# Patient Record
Sex: FEMALE | Race: WHITE | NOT HISPANIC OR LATINO | URBAN - METROPOLITAN AREA
[De-identification: names, ages, dates, MRNs, and addresses within clinical notes are randomized per-mention and may not be internally consistent; named-entity substitution may affect disease eponyms.]

---

## 2017-04-10 ENCOUNTER — APPOINTMENT (OUTPATIENT)
Dept: WOMENS IMAGING | Facility: HOSPITAL | Age: 52
End: 2017-04-10

## 2017-04-10 PROCEDURE — 77067 SCR MAMMO BI INCL CAD: CPT | Performed by: RADIOLOGY

## 2022-01-18 ENCOUNTER — OFFICE VISIT (OUTPATIENT)
Dept: FAMILY MEDICINE CLINIC | Facility: CLINIC | Age: 57
End: 2022-01-18

## 2022-01-18 VITALS
HEART RATE: 73 BPM | RESPIRATION RATE: 16 BRPM | DIASTOLIC BLOOD PRESSURE: 90 MMHG | HEIGHT: 67 IN | BODY MASS INDEX: 45.99 KG/M2 | SYSTOLIC BLOOD PRESSURE: 130 MMHG | OXYGEN SATURATION: 98 % | WEIGHT: 293 LBS | TEMPERATURE: 97.5 F

## 2022-01-18 DIAGNOSIS — R73.01 IMPAIRED FASTING GLUCOSE: ICD-10-CM

## 2022-01-18 DIAGNOSIS — I89.0 LYMPHEDEMA: ICD-10-CM

## 2022-01-18 DIAGNOSIS — I10 PRIMARY HYPERTENSION: Primary | ICD-10-CM

## 2022-01-18 DIAGNOSIS — E03.9 ACQUIRED HYPOTHYROIDISM: ICD-10-CM

## 2022-01-18 DIAGNOSIS — G47.33 OBSTRUCTIVE SLEEP APNEA SYNDROME: ICD-10-CM

## 2022-01-18 PROCEDURE — 99203 OFFICE O/P NEW LOW 30 MIN: CPT | Performed by: NURSE PRACTITIONER

## 2022-01-18 RX ORDER — LEVOTHYROXINE SODIUM 0.2 MG/1
200 TABLET ORAL DAILY
COMMUNITY
Start: 2021-12-10 | End: 2022-02-21 | Stop reason: SDUPTHER

## 2022-01-18 RX ORDER — LISINOPRIL 20 MG/1
20 TABLET ORAL DAILY
COMMUNITY
Start: 2014-01-17 | End: 2022-04-22 | Stop reason: SDUPTHER

## 2022-01-18 RX ORDER — FUROSEMIDE 20 MG/1
20 TABLET ORAL
COMMUNITY
Start: 2021-12-10 | End: 2022-11-17

## 2022-01-18 RX ORDER — ALPRAZOLAM 0.5 MG/1
0.5 TABLET ORAL
COMMUNITY
Start: 2013-08-26 | End: 2022-11-17 | Stop reason: SDUPTHER

## 2022-01-18 NOTE — PROGRESS NOTES
Subjective   Sanaz Quiroz is a 56 y.o. female.     History of Present Illness   Sanaz Quiroz 56 y.o. female who presents today for a new patient appointment.    she has a history of   Patient Active Problem List   Diagnosis   • Hypothyroidism   • Hypertension   • Lymphedema   • Anxiety   • Sleep apnea   .  she is here to establish care I reviewed the PFSH recorded today by my MA/LPN staff.   she   She has been feeling fairly well.  Patient was seeing PCP at [place].   Patient is currently seeing PT for lymphedema.     She has YOSEPH and has been given CPAP but has difficulty using due to claustrophobia.  She was prescribed alprazolam to use as needed for panic attack which she takes about once a month.  She tried using it for CPAP with no significant improvement.  She has seen Dr. Curry for pressure adjustments with no improvement.  She sleeps sitting up in recliner most nights.          She has hypothyroidism and is currently taking levothyroxine 200 mcg daily.  Her levels have been fluctuating for the past couple of years.  Her last lab set showed elevated TSH level and her PCP added 50 mcg daily but patent never started taking.    The following portions of the patient's history were reviewed and updated as appropriate: allergies, current medications, past family history, past medical history, past social history, past surgical history and problem list.    Review of Systems   Constitutional: Positive for fatigue. Negative for unexpected weight change.   Respiratory: Negative for shortness of breath.    Cardiovascular: Positive for leg swelling. Negative for chest pain and palpitations.   Psychiatric/Behavioral: Negative for behavioral problems.       Objective   Physical Exam  Vitals and nursing note reviewed.   Constitutional:       Appearance: She is well-developed.   Neck:      Thyroid: No thyromegaly.      Vascular: No carotid bruit.   Cardiovascular:      Rate and Rhythm: Normal rate and regular rhythm.    Pulmonary:      Effort: Pulmonary effort is normal.      Breath sounds: Normal breath sounds.   Neurological:      Mental Status: She is alert and oriented to person, place, and time.   Psychiatric:         Mood and Affect: Mood normal.         Behavior: Behavior normal.         Thought Content: Thought content normal.         Judgment: Judgment normal.         Assessment/Plan   Diagnoses and all orders for this visit:    1. Primary hypertension (Primary)  -     Comprehensive metabolic panel  -     Lipid panel  -     CBC and Differential  -     TSH  -     T4, free  -     T3, free  -     Hemoglobin A1c  -     Vitamin D 25 Hydroxy    2. Acquired hypothyroidism  -     Comprehensive metabolic panel  -     Lipid panel  -     CBC and Differential  -     TSH  -     T4, free  -     T3, free  -     Hemoglobin A1c  -     Vitamin D 25 Hydroxy    3. Obstructive sleep apnea syndrome  -     Comprehensive metabolic panel  -     Lipid panel  -     CBC and Differential  -     TSH  -     T4, free  -     T3, free  -     Hemoglobin A1c  -     Vitamin D 25 Hydroxy    4. Impaired fasting glucose  -     Comprehensive metabolic panel  -     Lipid panel  -     CBC and Differential  -     TSH  -     T4, free  -     T3, free  -     Hemoglobin A1c  -     Vitamin D 25 Hydroxy    5. Lymphedema          Will get updated labs today including free T3.  Will then decide if thyroid dose needs to be adjusted.  May also have to consider switching to brand name Synthroid if continued fluctuation.      Patient will keep appt with PT for lymphedema.      She will continue trying to use CPAP at night and will start with using it while sitting up in chair for a few nights to get used to the face mask.  She will schedule appt with ENT to discuss if she is a candidate for dental appliance. If no improvement in tolerability of CPAP and not eligible for dental appliance, She will f/u with sleep medicine to discuss alternate options.  Patient understands the  importance in treating sleep apnea and is working to tolerate CPAP.   She will f/u in office in 6 weeks.

## 2022-01-23 LAB
25(OH)D3+25(OH)D2 SERPL-MCNC: 7.8 NG/ML (ref 30–100)
ALBUMIN SERPL-MCNC: 4.3 G/DL (ref 3.8–4.9)
ALBUMIN/GLOB SERPL: 1.5 {RATIO} (ref 1.2–2.2)
ALP SERPL-CCNC: 88 IU/L (ref 44–121)
ALT SERPL-CCNC: 28 IU/L (ref 0–32)
AST SERPL-CCNC: 16 IU/L (ref 0–40)
BASOPHILS # BLD AUTO: 0 X10E3/UL (ref 0–0.2)
BASOPHILS NFR BLD AUTO: 1 %
BILIRUB SERPL-MCNC: 0.6 MG/DL (ref 0–1.2)
BUN SERPL-MCNC: 12 MG/DL (ref 6–24)
BUN/CREAT SERPL: 15 (ref 9–23)
CALCIUM SERPL-MCNC: 9.3 MG/DL (ref 8.7–10.2)
CHLORIDE SERPL-SCNC: 101 MMOL/L (ref 96–106)
CHOLEST SERPL-MCNC: 194 MG/DL (ref 100–199)
CO2 SERPL-SCNC: 26 MMOL/L (ref 20–29)
CREAT SERPL-MCNC: 0.81 MG/DL (ref 0.57–1)
EOSINOPHIL # BLD AUTO: 0.1 X10E3/UL (ref 0–0.4)
EOSINOPHIL NFR BLD AUTO: 2 %
ERYTHROCYTE [DISTWIDTH] IN BLOOD BY AUTOMATED COUNT: 14 % (ref 11.7–15.4)
GLOBULIN SER CALC-MCNC: 2.8 G/DL (ref 1.5–4.5)
GLUCOSE SERPL-MCNC: 106 MG/DL (ref 65–99)
HBA1C MFR BLD: 6.5 % (ref 4.8–5.6)
HCT VFR BLD AUTO: 45.7 % (ref 34–46.6)
HDLC SERPL-MCNC: 45 MG/DL
HGB BLD-MCNC: 15.1 G/DL (ref 11.1–15.9)
IMM GRANULOCYTES # BLD AUTO: 0 X10E3/UL (ref 0–0.1)
IMM GRANULOCYTES NFR BLD AUTO: 1 %
LDLC SERPL CALC-MCNC: 133 MG/DL (ref 0–99)
LYMPHOCYTES # BLD AUTO: 2.1 X10E3/UL (ref 0.7–3.1)
LYMPHOCYTES NFR BLD AUTO: 26 %
MCH RBC QN AUTO: 29.8 PG (ref 26.6–33)
MCHC RBC AUTO-ENTMCNC: 33 G/DL (ref 31.5–35.7)
MCV RBC AUTO: 90 FL (ref 79–97)
MONOCYTES # BLD AUTO: 0.7 X10E3/UL (ref 0.1–0.9)
MONOCYTES NFR BLD AUTO: 9 %
NEUTROPHILS # BLD AUTO: 5.1 X10E3/UL (ref 1.4–7)
NEUTROPHILS NFR BLD AUTO: 61 %
PLATELET # BLD AUTO: 298 X10E3/UL (ref 150–450)
POTASSIUM SERPL-SCNC: 4.3 MMOL/L (ref 3.5–5.2)
PROT SERPL-MCNC: 7.1 G/DL (ref 6–8.5)
RBC # BLD AUTO: 5.06 X10E6/UL (ref 3.77–5.28)
SODIUM SERPL-SCNC: 139 MMOL/L (ref 134–144)
T3FREE SERPL-MCNC: 3 PG/ML (ref 2–4.4)
T4 FREE SERPL-MCNC: 1.32 NG/DL (ref 0.82–1.77)
TRIGL SERPL-MCNC: 89 MG/DL (ref 0–149)
TSH SERPL-ACNC: 4.21 UIU/ML (ref 0.45–4.5)
VLDLC SERPL CALC-MCNC: 16 MG/DL (ref 5–40)
WBC # BLD AUTO: 8.1 X10E3/UL (ref 3.4–10.8)

## 2022-01-24 DIAGNOSIS — E55.9 VITAMIN D DEFICIENCY: Primary | ICD-10-CM

## 2022-02-21 DIAGNOSIS — E55.9 VITAMIN D DEFICIENCY: ICD-10-CM

## 2022-02-21 RX ORDER — LEVOTHYROXINE SODIUM 0.2 MG/1
200 TABLET ORAL DAILY
Qty: 30 TABLET | Refills: 0 | Status: SHIPPED | OUTPATIENT
Start: 2022-02-21 | End: 2022-03-28 | Stop reason: SDUPTHER

## 2022-02-21 NOTE — TELEPHONE ENCOUNTER
Caller: Sanaz Quiroz    Relationship: Self    Best call back number: [phone]     Requested Prescriptions:   Requested Prescriptions     Pending Prescriptions Disp Refills   • Cholecalciferol 1.25 MG (00591 UT) tablet 13 tablet 3     Sig: Take 1 tablet by mouth 1 (One) Time Per Week.   • levothyroxine (SYNTHROID, LEVOTHROID) 200 MCG tablet       Sig: Take 1 tablet by mouth Daily.        Pharm[facility] where request should be sent: [facility] [address] [place] [address] RD AT [facility] & [address]  [phone] [facility] [phone] FX     Additional details provided by patient: PATIENT IS OUT OF MEDICATION. VITAMIN D WAS SENT TO WRONG PHARMACY AND PATIENT NEVER RECEIVED IT.      Does the patient have less than a 3 day supply:  [x] Yes  [] No    Víctor Brown Rep   02/21/22 14:11 EST

## 2022-03-28 ENCOUNTER — OFFICE VISIT (OUTPATIENT)
Dept: FAMILY MEDICINE CLINIC | Facility: CLINIC | Age: 57
End: 2022-03-28

## 2022-03-28 VITALS
DIASTOLIC BLOOD PRESSURE: 80 MMHG | OXYGEN SATURATION: 98 % | RESPIRATION RATE: 16 BRPM | TEMPERATURE: 97.5 F | BODY MASS INDEX: 45.99 KG/M2 | SYSTOLIC BLOOD PRESSURE: 140 MMHG | WEIGHT: 293 LBS | HEIGHT: 67 IN | HEART RATE: 79 BPM

## 2022-03-28 DIAGNOSIS — I89.0 LYMPHEDEMA: ICD-10-CM

## 2022-03-28 DIAGNOSIS — E55.9 VITAMIN D DEFICIENCY: Primary | ICD-10-CM

## 2022-03-28 DIAGNOSIS — E03.9 ACQUIRED HYPOTHYROIDISM: ICD-10-CM

## 2022-03-28 PROCEDURE — 99214 OFFICE O/P EST MOD 30 MIN: CPT | Performed by: NURSE PRACTITIONER

## 2022-03-28 RX ORDER — LEVOTHYROXINE SODIUM 0.2 MG/1
200 TABLET ORAL DAILY
Qty: 90 TABLET | Refills: 1 | Status: SHIPPED | OUTPATIENT
Start: 2022-03-28 | End: 2022-10-28 | Stop reason: SDUPTHER

## 2022-03-28 RX ORDER — FLUCONAZOLE 150 MG/1
150 TABLET ORAL ONCE
Qty: 1 TABLET | Refills: 2 | Status: SHIPPED | OUTPATIENT
Start: 2022-03-28 | End: 2022-03-28

## 2022-03-28 NOTE — PROGRESS NOTES
"Subjective   Sanaz Quiroz is a 56 y.o. female.     History of Present Illness    Since the last visit, she has overall felt fairly well.  She has Hypothyroidism well controlled on current medication and will continue Rx.  she has been compliant with current medications have reviewed them.  The patient denies medication side effects.  Will refill medications. /80   Pulse 79   Temp 97.5 °F (36.4 °C)   Resp 16   Ht 170.2 cm (67\")   Wt (!) 167 kg (369 lb)   SpO2 98%   BMI 57.79 kg/m²     Results for orders placed or performed in visit on 01/18/22   Comprehensive metabolic panel    Specimen: Blood   Result Value Ref Range    Glucose 106 (H) 65 - 99 mg/dL    BUN 12 6 - 24 mg/dL    Creatinine 0.81 0.57 - 1.00 mg/dL    eGFR Non African Am 82 >59 mL/min/1.73    eGFR African Am 95 >59 mL/min/1.73    BUN/Creatinine Ratio 15 9 - 23    Sodium 139 134 - 144 mmol/L    Potassium 4.3 3.5 - 5.2 mmol/L    Chloride 101 96 - 106 mmol/L    Total CO2 26 20 - 29 mmol/L    Calcium 9.3 8.7 - 10.2 mg/dL    Total Protein 7.1 6.0 - 8.5 g/dL    Albumin 4.3 3.8 - 4.9 g/dL    Globulin 2.8 1.5 - 4.5 g/dL    A/G Ratio 1.5 1.2 - 2.2    Total Bilirubin 0.6 0.0 - 1.2 mg/dL    Alkaline Phosphatase 88 44 - 121 IU/L    AST (SGOT) 16 0 - 40 IU/L    ALT (SGPT) 28 0 - 32 IU/L   Lipid panel    Specimen: Blood   Result Value Ref Range    Total Cholesterol 194 100 - 199 mg/dL    Triglycerides 89 0 - 149 mg/dL    HDL Cholesterol 45 >39 mg/dL    VLDL Cholesterol Arnulfo 16 5 - 40 mg/dL    LDL Chol Calc (NIH) 133 (H) 0 - 99 mg/dL   TSH    Specimen: Blood   Result Value Ref Range    TSH 4.210 0.450 - 4.500 uIU/mL   T4, free    Specimen: Blood   Result Value Ref Range    Free T4 1.32 0.82 - 1.77 ng/dL   T3, free    Specimen: Blood   Result Value Ref Range    T3, Free 3.0 2.0 - 4.4 pg/mL   Hemoglobin A1c    Specimen: Blood   Result Value Ref Range    Hemoglobin A1C 6.5 (H) 4.8 - 5.6 %   Vitamin D 25 Hydroxy    Specimen: Blood   Result Value Ref Range    " 25 Hydroxy, Vitamin D 7.8 (L) 30.0 - 100.0 ng/mL   CBC and Differential    Specimen: Blood   Result Value Ref Range    WBC 8.1 3.4 - 10.8 x10E3/uL    RBC 5.06 3.77 - 5.28 x10E6/uL    Hemoglobin 15.1 11.1 - 15.9 g/dL    Hematocrit 45.7 34.0 - 46.6 %    MCV 90 79 - 97 fL    MCH 29.8 26.6 - 33.0 pg    MCHC 33.0 31.5 - 35.7 g/dL    RDW 14.0 11.7 - 15.4 %    Platelets 298 150 - 450 x10E3/uL    Neutrophil Rel % 61 Not Estab. %    Lymphocyte Rel % 26 Not Estab. %    Monocyte Rel % 9 Not Estab. %    Eosinophil Rel % 2 Not Estab. %    Basophil Rel % 1 Not Estab. %    Neutrophils Absolute 5.1 1.4 - 7.0 x10E3/uL    Lymphocytes Absolute 2.1 0.7 - 3.1 x10E3/uL    Monocytes Absolute 0.7 0.1 - 0.9 x10E3/uL    Eosinophils Absolute 0.1 0.0 - 0.4 x10E3/uL    Basophils Absolute 0.0 0.0 - 0.2 x10E3/uL    Immature Granulocyte Rel % 1 Not Estab. %    Immature Grans Absolute 0.0 0.0 - 0.1 x10E3/uL       Vitamin D deficiency and will increase to twice weekly.      She has been seeing OT at [place] for lymphedema but is open to seeing lymphedema clinic as she is seeing no significant improvement.       She also reports external vulvovaginal itching x 2 weeks. Denies discharge.  Has had odor but unsure if it is from that or urine. She has some urge incontinence at times but this is not a new issue.  She is unable to give urine sample today.   The following portions of the patient's history were reviewed and updated as appropriate: allergies, current medications, past family history, past medical history, past social history, past surgical history and problem list.    Review of Systems   Constitutional: Negative for unexpected weight change.   Respiratory: Negative for shortness of breath.    Cardiovascular: Positive for leg swelling. Negative for chest pain and palpitations.   Psychiatric/Behavioral: Negative for behavioral problems.       Objective   Physical Exam  Vitals and nursing note reviewed.   Constitutional:       Appearance: She is  well-developed.   Neck:      Thyroid: No thyromegaly.      Vascular: No carotid bruit.   Cardiovascular:      Rate and Rhythm: Normal rate and regular rhythm.   Pulmonary:      Effort: Pulmonary effort is normal.      Breath sounds: Normal breath sounds.   Neurological:      Mental Status: She is alert and oriented to person, place, and time.   Psychiatric:         Mood and Affect: Mood normal.         Behavior: Behavior normal.         Thought Content: Thought content normal.         Judgment: Judgment normal.         Assessment/Plan   Diagnoses and all orders for this visit:    1. Vitamin D deficiency (Primary)  -     Cholecalciferol 1.25 MG (91587 UT) tablet; Take 1 tablet by mouth 2 (Two) Times a Week.  Dispense: 26 tablet; Refill: 3    2. Lymphedema  -     Ambulatory Referral to Lymphedema Clinic    3. Acquired hypothyroidism  -     levothyroxine (SYNTHROID, LEVOTHROID) 200 MCG tablet; Take 1 tablet by mouth Daily.  Dispense: 90 tablet; Refill: 1    Other orders  -     fluconazole (Diflucan) 150 MG tablet; Take 1 tablet by mouth 1 (One) Time for 1 dose.  Dispense: 1 tablet; Refill: 2      F/u if vaginal itching does not improve.

## 2022-04-21 ENCOUNTER — HOSPITAL ENCOUNTER (OUTPATIENT)
Dept: OCCUPATIONAL THERAPY | Facility: HOSPITAL | Age: 57
Setting detail: THERAPIES SERIES
Discharge: HOME | End: 2022-04-21

## 2022-04-21 DIAGNOSIS — I89.0 LYMPHEDEMA OF BOTH LOWER EXTREMITIES: Primary | ICD-10-CM

## 2022-04-21 PROCEDURE — 97166 OT EVAL MOD COMPLEX 45 MIN: CPT

## 2022-04-21 PROCEDURE — 97535 SELF CARE MNGMENT TRAINING: CPT

## 2022-04-22 RX ORDER — LISINOPRIL 20 MG/1
20 TABLET ORAL DAILY
Qty: 30 TABLET | Refills: 0 | Status: SHIPPED | OUTPATIENT
Start: 2022-04-22 | End: 2022-06-13

## 2022-04-22 NOTE — TELEPHONE ENCOUNTER
Caller: Sanaz Quiroz    Relationship: Self    Best call back number: [phone] (M)    Requested Prescriptions:   Requested Prescriptions     Pending Prescriptions Disp Refills   • lisinopril (PRINIVIL,ZESTRIL) 20 MG tablet       Sig: Take 1 tablet by mouth Daily.        Pharmacy where request should be sent: [facility] [place] [address] [phone] [facility] [phone] FX     Additional details provided by patient: PATIENT IS OUT OF THIS MEDICATION. SHE TOOK HER LAST PILL THIS MORNING 04/22/22    Does the patient have less than a 3 day supply:  [x] Yes  [] No    Víctor Caraballo Rep   04/22/22 13:30 EDT

## 2022-05-12 NOTE — THERAPY EVALUATION
spoke again w/ pt regarding CT PE and the reasoning for doing the CT. Pt denied wanting to do the CT despite risk of blood clot. DO Hubbard explained benefits of CT, pt denied and will sign AMA form.    Outpatient Occupational Therapy Lymphedema Initial Evaluation  [facility]     Patient Name: Sanaz Quiroz  : 1965  MRN: [id number]  Today's Date: 2022      Visit Date: 2022  Patient and therapist both masked. Therapist with face shield and gloves.  Patient Active Problem List   Diagnosis   • Hypothyroidism   • Hypertension   • Lymphedema   • Anxiety   • Sleep apnea        Past Medical History:   Diagnosis Date   • Anxiety    • Hypertension    • Hypothyroidism    • Lymphedema    • Obesity    • Sleep apnea    • Type 2 diabetes mellitus (HCC) 2022    a1c 6.5         Past Surgical History:   Procedure Laterality Date   • CHOLECYSTECTOMY     • FRACTURE SURGERY           Visit Dx:     ICD-10-CM ICD-9-CM   1. 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 compression stockings;  -RE compression stockings;  -RE     Are you or can you be pregnant No  -RE No  -RE (r) patient (t)            Pain     Pain Location Leg  -RE --     Pain at Present 2  -RE --            Fall Risk Assessment    Any falls in the past year: No  -RE --            Services    Are you currently receiving Home Health services No  -RE --            Daily Activities    Primary Language English  -RE English  -RE (r) patient (t)     Are you able to read Yes  -RE --     Are you able to  "write Yes  -RE --     How does patient learn best? Listening  -RE --     Teaching needs identified Home Exercise Program;Management of Condition  -RE --     Patient is concerned about/has problems with Walking;Sitting;Other (comment)  due to increased edema  -RE --     Does patient have problems with the following? Anxiety;Panic Attack  -RE --     Barriers to learning None  -RE --     Functional Status mobility issues preventing performance of daily activities  at end of day  -RE --     Explanation of Functional Status Problem Independant with ADL's. Lives alone.  -RE --     Pt Participated in POC and Goals Yes  -RE --            Safety    Are you being hurt, hit, or frightened by anyone at home or in your life? No  -RE --     Are you being neglected by a caregiver No  -RE --     Have you had any of the following issues with Panic Attacks;Neglect/Abuse  -RE --           User Key  (r) = Recorded By, (t) = Taken By, (c) = Cosigned By    Initials Name Provider Type    RE Daria Lagunas OTR Occupational Therapist    patient Sanaz Quiroz --                 Lymphedema     Row Name 04/21/22 1000             Subjective Pain    Able to rate subjective pain? yes  -RE      Pre-Treatment Pain Level 2  -RE      Post-Treatment Pain Level 2  -RE              Lymphedema Assessment    Lymphedema Classification RLE:;LLE:;secondary;stage 2 (Spontaneously Irreversible)  -RE      Infections or Cellulitis? no  -RE              LLIS - Physical Concerns    The amount of pain associated with my lymphedema is: 2  -RE      The amount of limb heaviness associated with my lymphedema is: 2  -RE      The amount of skin tightness associated with my lymphedema is: 1  -RE      The size of my swollen limb(s) seems: 2  -RE      Lymphedema affects the movement of my swollen limb(s): 2  -RE      The strength in my swollen limb(s) is: 1  -RE              LLIS - Psychosocial Concerns    Lymphedema affects my body image (i.e., \"how I think I look\"). 1  " "-RE      Lymphedema affects my socializing with others. 1  -RE      Lymphedema affects my intimate relations with spouse or partner (rate 0 if not applicable 0  -RE      Lymphedema \"gets me down\" (i.e., depression, frustration, or anger) 1  -RE      I must rely on others for help due to my lymphedema. 0  -RE      I know what to do to manage my lymphedema 1  -RE              LLIS - Functional Concerns    Lymphedema affects my ability to perform self-care activities (i.e. eating, dressing, hygiene) 0  -RE      Lymphedema affects my ability to perform routine home or work-related activities. 0  -RE      Lymphedema affects my performance of preferred leisure activities. 1  -RE      Lymphedema affects proper fit of clothing/shoes 1  -RE      Lymphedema affects my sleep 1  -RE              General ROM    GENERAL ROM COMMENTS LE AROM is WFL. B knee extension is limited by tissue bulk  -RE              MMT (Manual Muscle Testing)    General MMT Comments B LE strength is grossly >4/5  -RE              Lymphedema Edema Assessment    Ptting Edema Category By grade out of 4  -RE      Pitting Edema + 3/4;Moderate  -RE      Stemmer Sign bilateral:;positive  -RE      Brighton Hump bilateral:;negative  -RE              Skin Changes/Observations    Location/Assessment Lower Extremity  -RE      Lower Extremity Conditions bilateral:;intact;clean;dry  -RE      Lower Extremity Color/Pigment bilateral:;hyperpigmented;P'eau d'orange;red  -RE      Lower Extremity Skin Details Lobules above knees are Red R>L  -RE              Lymphedema Sensation    Lymphedema Sensation Reports RLE:;LLE:;normal  -RE              Lymphedema Pulses/Capillary Refill    Lymphedema Pulses/Capillary Refill capillary refill  -RE      Capillary Refill lower extremity capillary refill  -RE      Lower Extremity Capillary Refill right:;left:;less than 3 seconds  -RE              Lymphedema Measurements    Measurement Type(s) Circumferential  -RE      Circumferential " Areas Lower extremities  -RE              BLE Circumferential (cm)    Measurement Location 1 20 cm above knee  -RE      Left 1 76.5 cm  -RE      Right 1 77 cm  -RE      Measurement Location 2 10 cm above knee  -RE      Left 2 76 cm  -RE      Right 2 80 cm  -RE      Measurement Location 3 Knee  -RE      Left 3 46 cm  -RE      Right 3 50 cm  -RE      Measurement Location 4 10 cm below knee  -RE      Left 4 49 cm  -RE      Right 4 47 cm  -RE      Measurement Location 5 20 cm below knee  -RE      Left 5 39.5 cm  -RE      Right 5 38 cm  -RE      Measurement Location 6 30 cm below knee  -RE      Left 6 30 cm  -RE      Right 6 29.7 cm  -RE      Measurement Location 7 Ankle  -RE      Left 7 29 cm  -RE      Right 7 26.5 cm  -RE      Measurement Location 8 Mid foot  -RE      Left 8 21.5 cm  -RE      Right 8 22 cm  -RE      LLE Circumferential Total 367.5 cm  -RE      RLE Circumferential Total 370.2 cm  -RE              Compression/Skin Care    Compression/Skin Care Comments Has somme OTC compression stockings Knee and thigh high.  -RE              Lymphedema Life Impact Scale Totals    A.  Total Q1 - Q17 (Do not include Q18) 17  -RE      B.  Total number of questions answered (Q1-Q17) 17  -RE      C. Divide A by B 1  -RE      D. Multiple C by 25 25  -RE            User Key  (r) = Recorded By, (t) = Taken By, (c) = Cosigned By    Initials Name Provider Type    Daria Heard, NELLA Occupational Therapist                        Therapy Education  Education Details: Discussed lymphedema treatment including estimated duration. Discussed risk reduction practices. Discussed velcro products vs bandaging and pump use at length as well as possibilities for compression garments. Patient wishes to maximize her treatment time and money spent.  Given: Edema management, Symptoms/condition management, Bandaging/dressing change, Other (comment)  Program: New  How Provided: Verbal, Demonstration  Provided to: Patient  Level of Understanding:  "Teach back education performed, Verbalized  75370 - OT Self Care/Mgmt Minutes: 30         OT Goals     Row Name 04/21/22 1400          OT Short Term Goals    STG Date to Achieve 05/05/22  -RE     STG 1 Patient independent and compliant with self-wrapping techniques of compression bandages and or velcro products with assistance of caregiver as needed for improved self-management of condition.  -RE     STG 1 Progress New  -RE     STG 2 Patient will demonstrate proper awareness of “What is Lymphedema?” and \"Healthy Habits\" for improved prevention, management, care of symptoms and ease of transition to self-care of condition.  -RE     STG 2 Progress New  -RE     STG 3 Patient will demonstrate decreased net edema of bilateral lower extremities >/= 5-10 cm  for decrease in edema, symptoms, decreased risk of infection and improved skin care/transition to self-care of condition.  -RE     STG 3 Progress New  -RE            Long Term Goals    LTG 1 Patient will demonstrate decreased net edema of bilateral lower extremities >/= 11-20 cm  for decrease in edema, symptoms, decreased risk of infection and improved skin care/transition to self-care of condition.  -RE     LTG 1 Progress New  -RE     LTG 2 Patient independent and compliant with initial home exercise program focused on diaphragmatic breathing, range of motion, flexibility to decrease edema and improve lymphatic flow for decreased edema and decreased risk of infection.  -RE     LTG 2 Progress New  -RE     LTG 3 Patient independent and compliant with use and care of compression garments and/or Velcro products with assistance of a caregiver as needed to promote self-care independence.  -RE     LTG 3 Progress New  -RE     LTG 4 Patient to improve LLIS by 4 points in 4 weeks.  -RE     LTG 4 Progress New  -RE            Time Calculation    OT Goal Re-Cert Due Date 07/21/22  -RE           User Key  (r) = Recorded By, (t) = Taken By, (c) = Cosigned By    Initials Name " Provider Type    RE Daria Lagunas OTR Occupational Therapist                 OT Assessment/Plan     Row Name 04/21/22 7927          OT Assessment    Impairments Edema;Impaired lymphatic circulation;Impaired venous circulation;Integumentary integrity  -RE     Assessment Comments Patient is a 56 y.o. female that presents with signs and symptoms consistent with bilateralLE lymphedema which extends from foot to mid thigh. Edema is firm with 3+  pitting. Edema is most severe in the R thigh lobule though both thighs are affected with lobules. Skin in the lobules is red and orange peel texture.  Strength and AROM are WFL. The total circumference of the R LE is 370.2 cm and the L LE is 367.5 cm. she could benefit from Complete Decongestive Therapy to decrease edema, protect and improve skin integrity, decrease the risk of infection and to learn self-care strategies.  Until we can begin CDT we will try to get insurance coverage for velcro compression products so she can begin compression at home. This should allow for some edema reduction and increased comfort as well as reduce the number of treatment days required.  -RE     Please refer to paper survey for additional self-reported information Yes  -RE     OT Diagnosis B LE lymphedema  -RE     OT Rehab Potential Good  -RE     Patient/caregiver participated in establishment of treatment plan and goals Yes  -RE     Patient would benefit from skilled therapy intervention Yes  -RE            OT Plan    OT Frequency 4x/week;3x/week  -RE     Predicted Duration of Therapy Intervention (OT) 4-6 weeks.Pt to return for fitting of velcro products prior to beginning treatment  -RE     Planned CPT's? OT EVAL MOD COMPLEXITY: 73825;OT THER PROC EA 15 MIN: 54364KG;OT SELF CARE/MGMT/TRAIN 15 MIN: 32936;OT MANUAL THERAPY EA 15 MIN: 96271  -RE     Planned Therapy Interventions (Optional Details) home exercise program;manual therapy techniques;patient/family education;other (see comments)   skin care and compression therapy  -RE     OT Plan Comments [facility] or [facility] to check benefits  -RE           User Key  (r) = Recorded By, (t) = Taken By, (c) = Cosigned By    Initials Name Provider Type    Daria Heard OTR Occupational Therapist                          Time Calculation:   OT Start Time: 1015  OT Stop Time: 1115  OT Time Calculation (min): 60 min  Total Timed Code Minutes- OT: 30 minute(s)  Timed Charges  15324 - OT Self Care/Mgmt Minutes: 30  Untimed Charges  OT Eval/Re-eval Minutes: 30  Total Minutes  Timed Charges Total Minutes: 30  Untimed Charges Total Minutes: 30   Total Minutes: 60     Therapy Charges for Today     Code Description Service Date Service Provider Modifiers Qty    [id number] HC OT SELF CARE/MGMT/TRAIN EA 15 MIN 4/21/2022 Daria Lagunas OTR GO 2    [id number]  OT EVAL MOD COMPLEXITY 2 4/21/2022 Daria Lagunas OTR GO 1                    NELLA King  4/21/2022

## 2022-05-13 ENCOUNTER — TELEPHONE (OUTPATIENT)
Dept: FAMILY MEDICINE CLINIC | Facility: CLINIC | Age: 57
End: 2022-05-13

## 2022-05-13 DIAGNOSIS — I89.0 LYMPHEDEMA: Primary | ICD-10-CM

## 2022-05-13 NOTE — TELEPHONE ENCOUNTER
Caller: Sanaz Quiroz    Relationship: Self    Best call back number: [phone]     What is the medical concern/diagnosis: LYMPHEDEMA     What specialty or service is being requested: PHYSICAL THERAPY    What is the provider, practice or medical service name: [facility]    What is the office location: [address] [place] [address]    What is the office phone number: [phone]    Any additional details: ABLE TO BE SEEN BY ROXI FLEMING / OLEG ARE BOOKED OUT TOO FAR    PATIENT IS SCHEDULED FOR 05/18/22

## 2022-06-13 RX ORDER — LISINOPRIL 20 MG/1
TABLET ORAL
Qty: 90 TABLET | Refills: 0 | Status: SHIPPED | OUTPATIENT
Start: 2022-06-13 | End: 2022-10-28 | Stop reason: SDUPTHER

## 2022-08-31 ENCOUNTER — TELEPHONE (OUTPATIENT)
Dept: FAMILY MEDICINE CLINIC | Facility: CLINIC | Age: 57
End: 2022-08-31

## 2022-08-31 NOTE — TELEPHONE ENCOUNTER
MADINA WITH [employer] CALLED IN REGARDS  LYMPHEDEMA PUMP FOR PATIENTS LEGS.. IT WAS FAXED ON 8/25/22    WANTS T KNOW IF IT WAS RECEIVED SIGNED AND DATED AND READY TO BE RETURNED  CALL BACK LUKE [phone]

## 2022-10-28 DIAGNOSIS — E03.9 ACQUIRED HYPOTHYROIDISM: ICD-10-CM

## 2022-10-28 RX ORDER — LISINOPRIL 20 MG/1
20 TABLET ORAL DAILY
Qty: 90 TABLET | Refills: 0 | Status: SHIPPED | OUTPATIENT
Start: 2022-10-28 | End: 2023-02-24

## 2022-10-28 RX ORDER — LEVOTHYROXINE SODIUM 0.2 MG/1
200 TABLET ORAL DAILY
Qty: 90 TABLET | Refills: 1 | Status: SHIPPED | OUTPATIENT
Start: 2022-10-28

## 2022-10-28 NOTE — TELEPHONE ENCOUNTER
Caller: Sanaz Quiroz    Relationship: Self    Best call back number: [phone]     Requested Prescriptions:   Requested Prescriptions     Pending Prescriptions Disp Refills   • lisinopril (PRINIVIL,ZESTRIL) 20 MG tablet 90 tablet 0     Sig: Take 1 tablet by mouth Daily.   • levothyroxine (SYNTHROID, LEVOTHROID) 200 MCG tablet 90 tablet 1     Sig: Take 1 tablet by mouth Daily.        Pharmacy where request should be sent: [facility] [id number] - [place] [id number] [facility] [phone] [facility] [phone] FX     Additional details provided by patient: PATIENT HAS A 3 DAY SUPPLY LEFT OF THESE MEDICATIONS     Does the patient have less than a 3 day supply:  [] Yes  [x] No    Víctor Wilder Rep   10/28/22 13:06 EDT

## 2022-10-28 NOTE — TELEPHONE ENCOUNTER
Rx Refill Note  Requested Prescriptions     Pending Prescriptions Disp Refills   • lisinopril (PRINIVIL,ZESTRIL) 20 MG tablet 90 tablet 0     Sig: Take 1 tablet by mouth Daily.   • levothyroxine (SYNTHROID, LEVOTHROID) 200 MCG tablet 90 tablet 1     Sig: Take 1 tablet by mouth Daily.      Last office visit with prescribing clinician: 3/28/2022      Next office visit with prescribing clinician: 1/17/2023

## 2022-11-15 ENCOUNTER — TELEPHONE (OUTPATIENT)
Dept: FAMILY MEDICINE CLINIC | Facility: CLINIC | Age: 57
End: 2022-11-15
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
## 2022-11-17 ENCOUNTER — OFFICE VISIT (OUTPATIENT)
Dept: FAMILY MEDICINE CLINIC | Facility: CLINIC | Age: 57
End: 2022-11-17

## 2022-11-17 VITALS
BODY MASS INDEX: 45.99 KG/M2 | DIASTOLIC BLOOD PRESSURE: 78 MMHG | RESPIRATION RATE: 16 BRPM | TEMPERATURE: 97.5 F | HEIGHT: 67 IN | SYSTOLIC BLOOD PRESSURE: 130 MMHG | WEIGHT: 293 LBS | OXYGEN SATURATION: 99 % | HEART RATE: 78 BPM

## 2022-11-17 DIAGNOSIS — N39.0 ACUTE UTI: ICD-10-CM
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
## 2022-11-17 DIAGNOSIS — R39.9 URINARY SYMPTOM OR SIGN: ICD-10-CM

## 2022-11-17 DIAGNOSIS — Z09 HOSPITAL DISCHARGE FOLLOW-UP: ICD-10-CM

## 2022-11-17 DIAGNOSIS — F41.9 ANXIETY: ICD-10-CM

## 2022-11-17 DIAGNOSIS — S42.201G CLOSED FRACTURE OF PROXIMAL END OF RIGHT HUMERUS WITH DELAYED HEALING, UNSPECIFIED FRACTURE MORPHOLOGY, SUBSEQUENT ENCOUNTER: ICD-10-CM

## 2022-11-17 LAB
BILIRUB BLD-MCNC: ABNORMAL MG/DL
CLARITY, POC: ABNORMAL
COLOR UR: YELLOW
EXPIRATION DATE: ABNORMAL
GLUCOSE UR STRIP-MCNC: NEGATIVE MG/DL
KETONES UR QL: ABNORMAL
LEUKOCYTE EST, POC: NEGATIVE
Lab: ABNORMAL
NITRITE UR-MCNC: POSITIVE MG/ML
PH UR: 5.5 [PH] (ref 5–8)
PROT UR STRIP-MCNC: ABNORMAL MG/DL
RBC # UR STRIP: NEGATIVE /UL
SP GR UR: 1.03 (ref 1–1.03)
UROBILINOGEN UR QL: ABNORMAL

## 2022-11-17 PROCEDURE — 99214 OFFICE O/P EST MOD 30 MIN: CPT | Performed by: NURSE PRACTITIONER

## 2022-11-17 PROCEDURE — 81003 URINALYSIS AUTO W/O SCOPE: CPT | Performed by: NURSE PRACTITIONER

## 2022-11-17 RX ORDER — TRAMADOL HYDROCHLORIDE 50 MG/1
TABLET ORAL
COMMUNITY
Start: 2022-11-16 | End: 2023-04-03

## 2022-11-17 RX ORDER — NITROFURANTOIN 25; 75 MG/1; MG/1
100 CAPSULE ORAL 2 TIMES DAILY
Qty: 14 CAPSULE | Refills: 0 | Status: SHIPPED | OUTPATIENT
Start: 2022-11-17 | End: 2022-11-24

## 2022-11-17 RX ORDER — HYDROCODONE BITARTRATE AND ACETAMINOPHEN 10; 325 MG/1; MG/1
TABLET ORAL
COMMUNITY
Start: 2022-11-12 | End: 2022-11-20

## 2022-11-17 RX ORDER — ALPRAZOLAM 0.5 MG/1
0.5 TABLET ORAL 3 TIMES DAILY PRN
Qty: 30 TABLET | Refills: 0 | Status: SHIPPED | OUTPATIENT
Start: 2022-11-17 | End: 2023-04-03 | Stop reason: SDUPTHER

## 2022-11-17 RX ORDER — OXYBUTYNIN CHLORIDE 10 MG/1
10 TABLET, EXTENDED RELEASE ORAL DAILY
Qty: 30 TABLET | Refills: 0 | Status: SHIPPED | OUTPATIENT
Start: 2022-11-17 | End: 2023-04-03
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
## 2022-11-23 ENCOUNTER — TELEPHONE (OUTPATIENT)
Dept: FAMILY MEDICINE CLINIC | Facility: CLINIC | Age: 57
End: 2022-11-23

## 2022-11-23 NOTE — TELEPHONE ENCOUNTER
Caller: Sanaz Quiroz    Relationship: Self    Best call back number: [phone]    What medication are you requesting: ANTIBIOTICS    What are your current symptoms: EARACHE,SORE THROAT AND EAR DRAINAGE    How long have you been experiencing symptoms:     Have you had these symptoms before:    [x] Yes  [] No    Have you been treated for these symptoms before:   [x] Yes  [] No    If a prescription is needed, what is your preferred pharmacy and phone number: [facility] [id number] - [place], [place] - [address] AT [address] [facility] [phone] [facility] [phone]      Additional notes:PATIENT STATED THAT SHE THINKS THAT SHE HAS AN EAR OR SINUS INFECTION. SHE WOULD LIKE TO GET SOME ANTIBIOTICS SENT OVER TO HER PHARMACY. SHE WOULD ALSO LIKE TO KNOW WHAT KIND OF OVER THE COUNTER MEDICATION PCP IRAM CHOWDHURY CAN RECOMMEND TO HELP HER FEEL BETTER.

## 2022-11-28 ENCOUNTER — OFFICE VISIT (OUTPATIENT)
Dept: FAMILY MEDICINE CLINIC | Facility: CLINIC | Age: 57
End: 2022-11-28

## 2022-11-28 VITALS
BODY MASS INDEX: 45.99 KG/M2 | SYSTOLIC BLOOD PRESSURE: 140 MMHG | DIASTOLIC BLOOD PRESSURE: 84 MMHG | WEIGHT: 293 LBS | HEIGHT: 67 IN | TEMPERATURE: 97.5 F | RESPIRATION RATE: 16 BRPM

## 2022-11-28 DIAGNOSIS — J06.9 ACUTE URI: ICD-10-CM

## 2022-11-28 DIAGNOSIS — I89.0 LYMPHEDEMA: ICD-10-CM

## 2022-11-28 DIAGNOSIS — B37.89 CANDIDA RASH OF GROIN: ICD-10-CM

## 2022-11-28 DIAGNOSIS — N39.0 ACUTE UTI: Primary | ICD-10-CM

## 2022-11-28 DIAGNOSIS — E03.9 ACQUIRED HYPOTHYROIDISM: ICD-10-CM

## 2022-11-28 DIAGNOSIS — E11.9 TYPE 2 DIABETES MELLITUS WITHOUT COMPLICATION, WITHOUT LONG-TERM CURRENT USE OF INSULIN: ICD-10-CM

## 2022-11-28 LAB
BILIRUB BLD-MCNC: NEGATIVE MG/DL
CLARITY, POC: CLEAR
COLOR UR: YELLOW
EXPIRATION DATE: NORMAL
EXPIRATION DATE: NORMAL
FLUAV AG UPPER RESP QL IA.RAPID: NOT DETECTED
FLUBV AG UPPER RESP QL IA.RAPID: NOT DETECTED
GLUCOSE UR STRIP-MCNC: NEGATIVE MG/DL
INTERNAL CONTROL: NORMAL
KETONES UR QL: NEGATIVE
LEUKOCYTE EST, POC: NEGATIVE
Lab: NORMAL
Lab: NORMAL
NITRITE UR-MCNC: NEGATIVE MG/ML
PH UR: 5.5 [PH] (ref 5–8)
PROT UR STRIP-MCNC: NEGATIVE MG/DL
RBC # UR STRIP: NEGATIVE /UL
SARS-COV-2 AG UPPER RESP QL IA.RAPID: NOT DETECTED
SP GR UR: 1.01 (ref 1–1.03)
UROBILINOGEN UR QL: NORMAL

## 2022-11-28 PROCEDURE — 99214 OFFICE O/P EST MOD 30 MIN: CPT | Performed by: NURSE PRACTITIONER

## 2022-11-28 PROCEDURE — 87428 SARSCOV & INF VIR A&B AG IA: CPT | Performed by: NURSE PRACTITIONER

## 2022-11-28 PROCEDURE — 81003 URINALYSIS AUTO W/O SCOPE: CPT | Performed by: NURSE PRACTITIONER

## 2022-11-28 RX ORDER — AZITHROMYCIN 250 MG/1
TABLET, FILM COATED ORAL
Qty: 6 TABLET | Refills: 0 | Status: SHIPPED | OUTPATIENT
Start: 2022-11-28 | End: 2023-04-03

## 2022-11-28 RX ORDER — NYSTATIN 100000 [USP'U]/G
POWDER TOPICAL 4 TIMES DAILY
Qty: 60 G | Refills: 11 | Status: SHIPPED | OUTPATIENT
Start: 2022-11-28

## 2022-11-28 NOTE — PROGRESS NOTES
Subjective   Sanaz Quiroz is a 57 y.o. female.     History of Present Illness    Nasal Congestion (Green drainage x 2 days)   Cough (Dry cough in the morning)   Earache   follow up UTI  She finished nitrofurantoin and would like to have urine rechecked.   The following portions of the patient's history were reviewed and updated as appropriate: allergies, current medications, past family history, past medical history, past social history, past surgical history and problem list.    Review of Systems   Constitutional: Positive for fatigue. Negative for unexpected weight change.   HENT: Positive for congestion, ear pain, postnasal drip, rhinorrhea and sinus pressure.    Respiratory: Positive for cough. Negative for shortness of breath.    Cardiovascular: Negative for chest pain and palpitations.   Psychiatric/Behavioral: Negative for behavioral problems.       Objective   Physical Exam  Vitals and nursing note reviewed.   Constitutional:       Appearance: Normal appearance. She is well-developed.   HENT:      Right Ear: Tympanic membrane, ear canal and external ear normal.      Left Ear: Tympanic membrane, ear canal and external ear normal.      Nose: Mucosal edema and rhinorrhea present.      Mouth/Throat:      Pharynx: Posterior oropharyngeal erythema present. No oropharyngeal exudate.   Cardiovascular:      Rate and Rhythm: Normal rate and regular rhythm.   Pulmonary:      Effort: Pulmonary effort is normal.      Breath sounds: Normal breath sounds.   Neurological:      Mental Status: She is alert and oriented to person, place, and time.   Psychiatric:         Mood and Affect: Mood normal.         Behavior: Behavior normal.         Thought Content: Thought content normal.         Judgment: Judgment normal.         Assessment & Plan   Diagnoses and all orders for this visit:    1. Acute UTI (Primary)  Comments:  resolved    2. Acute URI  -     azithromycin (Zithromax Z-Soruav) 250 MG tablet; Take 2 tablets by mouth  on day 1, then 1 tablet daily on days 2-5  Dispense: 6 tablet; Refill: 0    3. Candida rash of groin  -     nystatin (MYCOSTATIN) 254032 UNIT/GM powder; Apply  topically to the appropriate area as directed 4 (Four) Times a Day.  Dispense: 60 g; Refill: 11      Start abx if symptoms worsen or do not improve within the next week.     Will set patient up with chronic care management.

## 2022-11-29 ENCOUNTER — REFERRAL TRIAGE (OUTPATIENT)
Dept: CASE MANAGEMENT | Facility: OTHER | Age: 57
End: 2022-11-29

## 2022-11-30 ENCOUNTER — PATIENT OUTREACH (OUTPATIENT)
Dept: CASE MANAGEMENT | Facility: OTHER | Age: 57
End: 2022-11-30

## 2022-11-30 ENCOUNTER — TELEPHONE (OUTPATIENT)
Dept: CASE MANAGEMENT | Facility: OTHER | Age: 57
End: 2022-11-30

## 2022-11-30 LAB
BACTERIA UR CULT: NO GROWTH
BACTERIA UR CULT: NORMAL
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
## 2022-12-01 ENCOUNTER — PATIENT OUTREACH (OUTPATIENT)
Dept: CASE MANAGEMENT | Facility: OTHER | Age: 57
End: 2022-12-01

## 2022-12-01 NOTE — OUTREACH NOTE
Social Work Assessment  Questions/Answers    Flowsheet Row Most Recent Value   Referral Source nursing   Reason for Consult community resources, transportation, other (see comments)  [home care agency]   Preferred Language English   People in Home alone   Current Living Arrangements apartment   Primary Care Provided by self   Provides Primary Care For no one, unable/limited ability to care for self   Employment Status employed full-time   Source of Income salary/wages   Usual Activity Tolerance fair   Current Activity Tolerance fair        SDOH updated and reviewed with the patient during this program:  Financial Resource Strain: Low Risk    • Difficulty of Paying Living Expenses: Not very hard      Food Insecurity: No Food Insecurity   • Worried About Running Out of Food in the Last Year: Never true   • Ran Out of Food in the Last Year: Never true      Transportation Needs: No Transportation Needs   • Lack of Transportation (Medical): No   • Lack of Transportation (Non-Medical): No      Housing Stability: Low Risk    • Unable to Pay for Housing in the Last Year: No   • Number of Places Lived in the Last Year: 1   • Unstable Housing in the Last Year: No        Patient Outreach    MSW outreach to patient to discuss community resources available to patient. Patient states she needs assistive person to come shower a few times per week. Patient's family members are currently assisting her at this time, but patient stated she would like additional help. MSW recommended home care agencies such as [facility] or [facility] to patient and offered to provide contact information. Patient asked questions about [facility] benefits and MSW discussed calling member services number on back of insurance card to discuss further benefits with them. MSW offered to send list of private pay care agencies to patient and she stated that she can look them up herself and ended call. MSW unable to discuss transportation benefits  further with patient before she ended call. MSW to sign off.     ADELE AQUINO -   Ambulatory Case Management    12/1/2022, 10:29 EST      ADELE AQUINO -   Ambulatory Case Management    12/1/2022, 10:28 EST

## 2022-12-07 ENCOUNTER — TELEPHONE (OUTPATIENT)
Dept: CASE MANAGEMENT | Facility: OTHER | Age: 57
End: 2022-12-07

## 2022-12-09 ENCOUNTER — TELEPHONE (OUTPATIENT)
Dept: CASE MANAGEMENT | Facility: OTHER | Age: 57
End: 2022-12-09

## 2022-12-09 ENCOUNTER — PATIENT OUTREACH (OUTPATIENT)
Dept: CASE MANAGEMENT | Facility: OTHER | Age: 57
End: 2022-12-09

## 2022-12-09 ENCOUNTER — HOME HEALTH ADMISSION (OUTPATIENT)
Dept: HOME HEALTH SERVICES | Facility: HOME HEALTHCARE | Age: 57
End: 2022-12-09
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
## 2022-12-09 DIAGNOSIS — S49.91XA SHOULDER INJURY, RIGHT, INITIAL ENCOUNTER: Primary | ICD-10-CM

## 2022-12-09 NOTE — OUTREACH NOTE
AMBULATORY CASE MANAGEMENT NOTE    Name and Relationship of Patient/Support Person: [facility] representive - Other    Patient Outreach    Spoke with patient and [facility] today.  Spoke with Eileen, assisted patient in understanding how medical is billed and paid by auto insurance. - Pt. Reassured,     Plan:  referral to  placed, f/u with patient once referral is signed.  F/u with patient once  has been faxed to keep patient aware of care process and referrals.    Monitor of surgical readiness 12/19/22        Education Documentation  No documentation found.        STEFAN MCFARLAND  Ambulatory Case Management    12/9/2022, 11:15 EST     [facility] Interim Update      Spoke to Gertrudis Morales, with [facility].  Referral faxed to [phone].  Will continue to f/u with this patient.
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
## 2022-12-13 ENCOUNTER — TELEPHONE (OUTPATIENT)
Dept: FAMILY MEDICINE CLINIC | Facility: CLINIC | Age: 57
End: 2022-12-13
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
## 2022-12-14 ENCOUNTER — TELEPHONE (OUTPATIENT)
Dept: FAMILY MEDICINE CLINIC | Facility: CLINIC | Age: 57
End: 2022-12-14

## 2022-12-14 ENCOUNTER — PATIENT OUTREACH (OUTPATIENT)
Dept: CASE MANAGEMENT | Facility: OTHER | Age: 57
End: 2022-12-14

## 2022-12-14 NOTE — TELEPHONE ENCOUNTER
Caller: Sanaz Quiroz    Relationship to patient: Self    Best call back number:[phone]    Patient is needing: PATIENT IS NEEDING A STATEMENT TO BE CLEARED FOR A COMPLETE SHOULDER REPLACEMENT.  THE SURGEON SEEN IN HER CHART FROM 2 YEARS AGO A ECHO WAS RECOMMENDED AND PATIENT NEEDS A NOTE FROM HER PRIMARY CARE PROVIDER THAT SHE CAN BE CLEARED FOR THIS SO SHE WONT MISS HER SURGERY SINCE THE CARDIOLOGIST WAS SEEN 2 YEARS AGO.      PATIENT IS NEEDING A CALL BACK AS SOON AS POSSIBLE BECAUSE OF HER SURGERY

## 2022-12-14 NOTE — TELEPHONE ENCOUNTER
PATIENT IS CALLING STATING THAT SHE HAS AN APPOINTMENT SET UP FOR A ECHO AT [facility].     PATIENT WOULD LIKE TO KNOW IF THERE IS ANYTHING ELSE SHE NEEDS TO DO.    PLEASE CALL TO DISCUSS AND ADVISE.    98

## 2022-12-14 NOTE — OUTREACH NOTE
"AMBULATORY CASE MANAGEMENT NOTE    Name and Relationship of Patient/Support Person: Sanaz Quiroz - Self     Patient Outreach    Received call from patient this morning.  Surgery has been postponed x2 weeks.  ECHO completed, will need to be cleared by Cardiologist.      Requesting Nystatin cream for folds instead of the powder.  Advised powder and placing wash clothes under breast and arm folds to prevent rubbing and assist with air flow.    Requesting assistance with bathing and dressing only at this time.  Not desiring assistance with lymphedema wraps.    Patient Outreach    ECHO scheduled at [facility] Friday morning.      Patient Outreach    Spoke with patient today regarding surgical readiness and f/u ECHO.    With careful review of ECHO result and statement of ECHO f/u ordered in 2019 explained results to patient along with potential surgical complications that could occur if an ECHO was not completed per order.  Patient stating, she didn't understand because no one told her that it was \"that important 2 years ago.\"    Assisted patient in understanding medical need for another ECHO f/u to be preformed prior to surgery.  Patient stating frustration tearfully that she didn't want to postpone her surgery.  Reassured, that a f/u ECHO and cardiologist would be ordered.  As our doctors can not sign off on surgical clearance at this time.    Prior to closing conversation with patient, she was able to teach back need for echo and f/u with cardiologist prior to surgery.  She was less tearful and more calm with conversation, stating her gratitude for our assistance and explanation.    Plan:  Referral for cardiologist for ECHO        Education Documentation  No documentation found.        STEFAN MCFARLAND  Ambulatory Case Management    12/14/2022, 14:28 EST  "

## 2022-12-16 ENCOUNTER — TELEPHONE (OUTPATIENT)
Dept: CASE MANAGEMENT | Facility: OTHER | Age: 57
End: 2022-12-16

## 2022-12-16 DIAGNOSIS — B37.2 CANDIDAL DERMATITIS: Primary | ICD-10-CM

## 2023-01-04 ENCOUNTER — PATIENT OUTREACH (OUTPATIENT)
Dept: CASE MANAGEMENT | Facility: OTHER | Age: 58
End: 2023-01-04
Payer: COMMERCIAL

## 2023-01-04 NOTE — OUTREACH NOTE
AMBULATORY CASE MANAGEMENT NOTE    Name and Relationship of Patient/Support Person: Sanaz Quiroz - Self    Patient Outreach    Spoke with patient today, Echo completed has been seen by cardiology and has been given clearance for surgery on 1/9/23.   Will continue to follow until after surgery.  Will re-evaluate need for assistance post surgical procedure.    Education Documentation  No documentation found.        STEFAN MCFARLAND  Ambulatory Case Management    1/4/2023, 12:36 EST

## 2023-02-09 ENCOUNTER — TELEPHONE (OUTPATIENT)
Dept: CASE MANAGEMENT | Facility: OTHER | Age: 58
End: 2023-02-09
Payer: COMMERCIAL

## 2023-02-09 DIAGNOSIS — Z98.890 S/P SHOULDER SURGERY: Primary | ICD-10-CM
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
## 2023-02-13 ENCOUNTER — PATIENT OUTREACH (OUTPATIENT)
Dept: CASE MANAGEMENT | Facility: OTHER | Age: 58
End: 2023-02-13
Payer: COMMERCIAL

## 2023-02-13 NOTE — OUTREACH NOTE
AMBULATORY CASE MANAGEMENT NOTE    Name and Relationship of Patient/Support Person: Sanaz Quiroz - Self    Patient Outreach    Spoke with patient today.  States that she still has not received a script for her lift chair.  Copy sent to patient and faxed to penny Johnson.  [phone]    No further needs voiced.  Will f/u in 2 weeks on 3/6/23    Education Documentation  No documentation found.        Susanna MCFARLAND  Ambulatory Case Management    2/13/2023, 14:24 EST

## 2023-02-24 RX ORDER — LISINOPRIL 20 MG/1
TABLET ORAL
Qty: 90 TABLET | Refills: 0 | Status: SHIPPED | OUTPATIENT
Start: 2023-02-24

## 2023-02-24 NOTE — TELEPHONE ENCOUNTER
Rx Refill Note  Requested Prescriptions     Pending Prescriptions Disp Refills   • lisinopril (PRINIVIL,ZESTRIL) 20 MG tablet [Pharmacy Med Name: LISINOPRIL 20 MG TABLET] 90 tablet 0     Sig: TAKE ONE TABLET BY MOUTH DAILY      Last office visit with prescribing clinician: 11/28/2022   Last telemedicine visit with prescribing clinician: Visit date not found   Next office visit with prescribing clinician: Visit date not found                         Would you like a call back once the refill request has been completed: [] Yes [] No    If the office needs to give you a call back, can they leave a voicemail: [] Yes [] No    Elaina Shea MA  02/24/23, 08:37 EST

## 2023-03-06 ENCOUNTER — PATIENT OUTREACH (OUTPATIENT)
Dept: CASE MANAGEMENT | Facility: OTHER | Age: 58
End: 2023-03-06
Payer: COMMERCIAL

## 2023-03-06 NOTE — OUTREACH NOTE
AMBULATORY CASE MANAGEMENT NOTE    Name and Relationship of Patient/Support Person: Sanaz Quiroz - Self    Patient Outreach    Spoke with patient, states that her recliner chair is to be delivered tomorrow, 3/7/23.    Verbalizing no further needs.    Plan: 4/3/23  Update Care Gaps,  Chart review,  ACP  AWV?    Education Documentation  No documentation found.        Susanna MCFARLAND  Ambulatory Case Management    3/6/2023, 14:11 EST

## 2023-04-03 ENCOUNTER — OFFICE VISIT (OUTPATIENT)
Dept: FAMILY MEDICINE CLINIC | Facility: CLINIC | Age: 58
End: 2023-04-03
Payer: COMMERCIAL

## 2023-04-03 ENCOUNTER — PATIENT OUTREACH (OUTPATIENT)
Dept: CASE MANAGEMENT | Facility: OTHER | Age: 58
End: 2023-04-03
Payer: COMMERCIAL

## 2023-04-03 ENCOUNTER — TELEPHONE (OUTPATIENT)
Dept: CASE MANAGEMENT | Facility: OTHER | Age: 58
End: 2023-04-03
Payer: COMMERCIAL

## 2023-04-03 VITALS
SYSTOLIC BLOOD PRESSURE: 110 MMHG | OXYGEN SATURATION: 99 % | BODY MASS INDEX: 45.99 KG/M2 | WEIGHT: 293 LBS | DIASTOLIC BLOOD PRESSURE: 76 MMHG | HEIGHT: 67 IN | TEMPERATURE: 97.5 F | RESPIRATION RATE: 16 BRPM | HEART RATE: 78 BPM

## 2023-04-03 DIAGNOSIS — E55.9 VITAMIN D DEFICIENCY: ICD-10-CM

## 2023-04-03 DIAGNOSIS — R35.0 FREQUENT URINATION: ICD-10-CM

## 2023-04-03 DIAGNOSIS — E11.9 TYPE 2 DIABETES MELLITUS WITHOUT COMPLICATION, WITHOUT LONG-TERM CURRENT USE OF INSULIN: Primary | ICD-10-CM

## 2023-04-03 DIAGNOSIS — E03.9 ACQUIRED HYPOTHYROIDISM: ICD-10-CM

## 2023-04-03 DIAGNOSIS — I89.0 LYMPHEDEMA: Primary | ICD-10-CM

## 2023-04-03 DIAGNOSIS — F41.9 ANXIETY: ICD-10-CM

## 2023-04-03 LAB
BILIRUB BLD-MCNC: NEGATIVE MG/DL
CLARITY, POC: CLEAR
COLOR UR: YELLOW
EXPIRATION DATE: NORMAL
GLUCOSE UR STRIP-MCNC: NEGATIVE MG/DL
KETONES UR QL: NEGATIVE
LEUKOCYTE EST, POC: NEGATIVE
Lab: NORMAL
NITRITE UR-MCNC: NEGATIVE MG/ML
PH UR: 5.5 [PH] (ref 5–8)
PROT UR STRIP-MCNC: NEGATIVE MG/DL
RBC # UR STRIP: NEGATIVE /UL
SP GR UR: 1.03 (ref 1–1.03)
UROBILINOGEN UR QL: NORMAL

## 2023-04-03 RX ORDER — ALPRAZOLAM 0.5 MG/1
0.5 TABLET ORAL 3 TIMES DAILY PRN
Qty: 30 TABLET | Refills: 0 | Status: SHIPPED | OUTPATIENT
Start: 2023-04-03

## 2023-04-03 NOTE — PROGRESS NOTES
"Subjective   Sanaz Quiroz is a 57 y.o. female.     History of Present Illness    Since the last visit, she has overall felt fairly well.  She has Primary Hypertension and well controlled on current medication and Hypothyroidism and must update labs to continue treatment.  she has been compliant with current medications have reviewed them.  The patient denies medication side effects.  Will refill medications. /76   Pulse 78   Temp 97.5 °F (36.4 °C)   Resp 16   Ht 170.2 cm (67\")   Wt (!) 175 kg (386 lb)   SpO2 99%   BMI 60.46 kg/m²     Results for orders placed or performed in visit on 04/03/23   POCT urinalysis dipstick, automated    Specimen: Urine   Result Value Ref Range    Color Yellow Yellow, Straw, Dark Yellow, Gertrudis    Clarity, UA Clear Clear    Specific Gravity  1.030 1.005 - 1.030    pH, Urine 5.5 5.0 - 8.0    Leukocytes Negative Negative    Nitrite, UA Negative Negative    Protein, POC Negative Negative mg/dL    Glucose, UA Negative Negative mg/dL    Ketones, UA Negative Negative    Urobilinogen, UA 0.2 E.U./dL Normal, 0.2 E.U./dL    Bilirubin Negative Negative    Blood, UA Negative Negative    Lot Number [id number]     Expiration Date 42,023          The following portions of the patient's history were reviewed and updated as appropriate: allergies, current medications, past family history, past medical history, past social history, past surgical history and problem list.    Review of Systems   Constitutional: Negative for unexpected weight change.   Respiratory: Negative for shortness of breath.    Cardiovascular: Negative for chest pain and palpitations.   Psychiatric/Behavioral: Negative for behavioral problems.       Objective   Physical Exam  Vitals and nursing note reviewed.   Constitutional:       Appearance: She is well-developed.   Cardiovascular:      Rate and Rhythm: Normal rate and regular rhythm.   Pulmonary:      Effort: Pulmonary effort is normal.      Breath sounds: Normal " breath sounds.   Neurological:      Mental Status: She is alert and oriented to person, place, and time.   Psychiatric:         Mood and Affect: Mood normal.         Behavior: Behavior normal.         Thought Content: Thought content normal.         Judgment: Judgment normal.         Assessment & Plan   Diagnoses and all orders for this visit:    1. Type 2 diabetes mellitus without complication, without long-term current use of insulin (Primary)  -     Comprehensive metabolic panel  -     Lipid panel  -     CBC and Differential  -     TSH  -     T4, free  -     Hemoglobin A1c  -     T3, free    2. Frequent urination  -     Urine Culture - Urine, Urine, Clean Catch  -     POCT urinalysis dipstick, automated    3. Acquired hypothyroidism  -     Comprehensive metabolic panel  -     Lipid panel  -     CBC and Differential  -     TSH  -     T4, free  -     Hemoglobin A1c  -     T3, free    4. Vitamin D deficiency  -     Cholecalciferol 1.25 MG (59081 UT) tablet; Take 1 tablet by mouth 2 (Two) Times a Week.  Dispense: 26 tablet; Refill: 3  -     Vitamin D,25-Hydroxy    5. Anxiety  -     ALPRAZolam (XANAX) 0.5 MG tablet; Take 1 tablet by mouth 3 (Three) Times a Day As Needed for Anxiety.  Dispense: 30 tablet; Refill: 0          ELIDIA reviewed.

## 2023-04-03 NOTE — OUTREACH NOTE
AMBULATORY CASE MANAGEMENT NOTE    Name and Relationship of Patient/Support Person: Gabriella Sanaz - Self    Patient Outreach    Spoke with patient today, was seen by PCP today.  Requesting prescription for compression stockings, order pended to PCP.  Requesting a printed script to take to [place].      Attempted to close some care gaps at this time.  Patient couldn't stay on phone.  Will send via [employer].    Patient does need to schedule AWV.      Ready to close for no further needs.      Education Documentation  No documentation found.        Susanna MCFARLAND  Ambulatory Case Management    4/3/2023, 15:03 EDT

## 2023-04-05 LAB
25(OH)D3+25(OH)D2 SERPL-MCNC: 33.3 NG/ML (ref 30–100)
ALBUMIN SERPL-MCNC: 4.1 G/DL (ref 3.5–5.2)
ALBUMIN/GLOB SERPL: 1.4 G/DL
ALP SERPL-CCNC: 90 U/L (ref 39–117)
ALT SERPL-CCNC: 16 U/L (ref 1–33)
AST SERPL-CCNC: 12 U/L (ref 1–32)
BACTERIA UR CULT: NORMAL
BACTERIA UR CULT: NORMAL
BASOPHILS # BLD AUTO: 0.03 10*3/MM3 (ref 0–0.2)
BASOPHILS NFR BLD AUTO: 0.4 % (ref 0–1.5)
BILIRUB SERPL-MCNC: 0.5 MG/DL (ref 0–1.2)
BUN SERPL-MCNC: 15 MG/DL (ref 6–20)
BUN/CREAT SERPL: 19.2 (ref 7–25)
CALCIUM SERPL-MCNC: 9.3 MG/DL (ref 8.6–10.5)
CHLORIDE SERPL-SCNC: 103 MMOL/L (ref 98–107)
CHOLEST SERPL-MCNC: 190 MG/DL (ref 0–200)
CO2 SERPL-SCNC: 26.5 MMOL/L (ref 22–29)
CREAT SERPL-MCNC: 0.78 MG/DL (ref 0.57–1)
EGFRCR SERPLBLD CKD-EPI 2021: 89.3 ML/MIN/1.73
EOSINOPHIL # BLD AUTO: 0.19 10*3/MM3 (ref 0–0.4)
EOSINOPHIL NFR BLD AUTO: 2.8 % (ref 0.3–6.2)
ERYTHROCYTE [DISTWIDTH] IN BLOOD BY AUTOMATED COUNT: 14 % (ref 12.3–15.4)
GLOBULIN SER CALC-MCNC: 2.9 GM/DL
GLUCOSE SERPL-MCNC: 145 MG/DL (ref 65–99)
HBA1C MFR BLD: 6.5 % (ref 4.8–5.6)
HCT VFR BLD AUTO: 42.2 % (ref 34–46.6)
HDLC SERPL-MCNC: 43 MG/DL (ref 40–60)
HGB BLD-MCNC: 13.7 G/DL (ref 12–15.9)
IMM GRANULOCYTES # BLD AUTO: 0.03 10*3/MM3 (ref 0–0.05)
IMM GRANULOCYTES NFR BLD AUTO: 0.4 % (ref 0–0.5)
LDLC SERPL CALC-MCNC: 132 MG/DL (ref 0–100)
LYMPHOCYTES # BLD AUTO: 1.91 10*3/MM3 (ref 0.7–3.1)
LYMPHOCYTES NFR BLD AUTO: 28.1 % (ref 19.6–45.3)
MCH RBC QN AUTO: 29 PG (ref 26.6–33)
MCHC RBC AUTO-ENTMCNC: 32.5 G/DL (ref 31.5–35.7)
MCV RBC AUTO: 89.4 FL (ref 79–97)
MONOCYTES # BLD AUTO: 0.59 10*3/MM3 (ref 0.1–0.9)
MONOCYTES NFR BLD AUTO: 8.7 % (ref 5–12)
NEUTROPHILS # BLD AUTO: 4.05 10*3/MM3 (ref 1.7–7)
NEUTROPHILS NFR BLD AUTO: 59.6 % (ref 42.7–76)
NRBC BLD AUTO-RTO: 0 /100 WBC (ref 0–0.2)
PLATELET # BLD AUTO: 264 10*3/MM3 (ref 140–450)
POTASSIUM SERPL-SCNC: 4.6 MMOL/L (ref 3.5–5.2)
PROT SERPL-MCNC: 7 G/DL (ref 6–8.5)
RBC # BLD AUTO: 4.72 10*6/MM3 (ref 3.77–5.28)
SODIUM SERPL-SCNC: 141 MMOL/L (ref 136–145)
T3FREE SERPL-MCNC: 2.7 PG/ML (ref 2–4.4)
T4 FREE SERPL-MCNC: 1.13 NG/DL (ref 0.93–1.7)
TRIGL SERPL-MCNC: 84 MG/DL (ref 0–150)
TSH SERPL DL<=0.005 MIU/L-ACNC: 14.6 UIU/ML (ref 0.27–4.2)
VLDLC SERPL CALC-MCNC: 15 MG/DL (ref 5–40)
WBC # BLD AUTO: 6.8 10*3/MM3 (ref 3.4–10.8)

## 2023-04-07 ENCOUNTER — TELEPHONE (OUTPATIENT)
Dept: FAMILY MEDICINE CLINIC | Facility: CLINIC | Age: 58
End: 2023-04-07

## 2023-04-07 NOTE — TELEPHONE ENCOUNTER
Caller: Sanaz Quiroz    Relationship: Self    Best call back number: [phone]    What is the best time to reach you: ANY TIME    Who are you requesting to speak with (clinical staff, provider,  specific staff member): CLINICAL STAFF    What was the call regarding: PATIENT WOULD LIKE TO KNOW STATUS OF IRAM PRUITT SENDING A PRESCRIPTION FOR COMPRESSION STOCKINGS.    Do you require a callback: YES    PLEASE ADVISE.

## 2023-04-07 NOTE — TELEPHONE ENCOUNTER
Caller: Sanaz Quiroz    Relationship: Self    Best call back number: [phone]    What is the best time to reach you: ANY TIME    Who are you requesting to speak with (clinical staff, provider,  specific staff member): CLINICAL STAFF    What was the call regarding: PATIENT WOULD LIKE TO KNOW IF IRAM PRUITT INCREASED HER DOSAGE FOR HER LEVOTHYROXINE MEDICATION.    Do you require a callback: YES    PLEASE ADVISE.

## 2023-04-11 ENCOUNTER — TELEPHONE (OUTPATIENT)
Dept: FAMILY MEDICINE CLINIC | Facility: CLINIC | Age: 58
End: 2023-04-11
Payer: COMMERCIAL

## 2023-04-11 DIAGNOSIS — E03.9 ACQUIRED HYPOTHYROIDISM: ICD-10-CM

## 2023-04-11 NOTE — TELEPHONE ENCOUNTER
Caller: Sanaz Quiroz    Relationship to patient: Self    Best call back number: [phone]     Patient is needing: PATIENT IS CALLING BACK TO CHECK TO SEE IF HER MEDICATIONS NEED TO BE CHANGED.  SHE STATES HER THYROID LEVELS WERE ELEVATED AND SO WAS HER A1C.      [facility] [id number] - [place], [place] - [address] AT [address] - [phone]  - [phone]  [phone]    PLEASE ADVISE.

## 2023-04-14 NOTE — TELEPHONE ENCOUNTER
A1c increased back up to 6.5, no medication change needed at this time as she is still at goal for diabetic.  Encourage lifestyle modifications to treat.     TSH was elevated but free T4 and free T3 were still in range.  Free t3 had decreased and is closer to low end of normal so if patient is symptomatic then can adjust medication and recheck in 8 weeks or just recheck in 3 months.

## 2023-04-17 ENCOUNTER — TELEPHONE (OUTPATIENT)
Dept: FAMILY MEDICINE CLINIC | Facility: CLINIC | Age: 58
End: 2023-04-17

## 2023-04-17 RX ORDER — LEVOTHYROXINE SODIUM 0.2 MG/1
200 TABLET ORAL DAILY
Qty: 90 TABLET | Refills: 1 | Status: SHIPPED | OUTPATIENT
Start: 2023-04-17

## 2023-04-17 RX ORDER — LEVOTHYROXINE SODIUM 0.03 MG/1
25 TABLET ORAL
Qty: 90 TABLET | Refills: 0 | Status: SHIPPED | OUTPATIENT
Start: 2023-04-17

## 2023-04-17 NOTE — TELEPHONE ENCOUNTER
I spoke with pt she is very fatigue and feels like her hair is falling out. She is wanting to know if you want to change her thyroid medication

## 2023-04-17 NOTE — TELEPHONE ENCOUNTER
The next tablet amount is 300 mcg which is too much.  I am going to add an extra 25 mcg tablet to the 200 mcg she is taking for total of 225 mcg daily.   Will recheck labs in 8 weeks.

## 2023-04-17 NOTE — TELEPHONE ENCOUNTER
Caller: Sanaz Quiroz    Relationship: Self    Best call back number: [phone]    What is the best time to reach you: ANYTIME    Who are you requesting to speak with (clinical staff, provider,  specific staff member): CLINICAL STAFF    Do you know the name of the person who called: PATIENT     What was the call regarding: PATIENT CALLED AND SAID THAT SHE NEEDS TO KNOW IF IRAM CHOWDHURY WILL BE ADJUSTING HER THYROID MEDICATION     Do you require a callback: YES

## 2023-04-18 NOTE — TELEPHONE ENCOUNTER
Justa Grady APRN ([place])         12:58 PM  Note   The next tablet amount is 300 mcg which is too much.  I am going to add an extra 25 mcg tablet to the 200 mcg she is taking for total of 225 mcg daily.   Will recheck labs in 8 weeks.                  12:58 PM  Justa Grady)GARY routed this conversation to Me         AM    2:58 PM  You routed this conversation to Justa Grady APRN ([place])   Me     AM     2:58 PM  Note   I spoke with patient

## 2023-04-20 ENCOUNTER — PATIENT OUTREACH (OUTPATIENT)
Dept: CASE MANAGEMENT | Facility: OTHER | Age: 58
End: 2023-04-20
Payer: COMMERCIAL

## 2023-04-20 NOTE — OUTREACH NOTE
AMBULATORY CASE MANAGEMENT NOTE    Name and Relationship of Patient/Support Person: Sanaz Quiroz - Self    Patient Outreach    Patient stating that she never received a copy of the order for compression stockings.  Sent attachment via email    Education Documentation  No documentation found.        Susanna MCFARLAND  Ambulatory Case Management    4/20/2023, 13:52 EDT

## 2023-05-03 ENCOUNTER — TELEPHONE (OUTPATIENT)
Dept: CASE MANAGEMENT | Facility: OTHER | Age: 58
End: 2023-05-03
Payer: COMMERCIAL

## 2023-08-21 RX ORDER — LISINOPRIL 20 MG/1
TABLET ORAL
Qty: 30 TABLET | Refills: 0 | Status: SHIPPED | OUTPATIENT
Start: 2023-08-21

## 2023-10-12 ENCOUNTER — TELEPHONE (OUTPATIENT)
Dept: FAMILY MEDICINE CLINIC | Facility: CLINIC | Age: 58
End: 2023-10-12

## 2023-10-12 NOTE — TELEPHONE ENCOUNTER
Caller: Sanaz Quiroz    Relationship: Self    Best call back number: [phone]       What was the call regarding: PATIENT CALLED TO CHECK STATUS OF REFILL REQUEST. STATES THAT SHE HAS BEEN OUT OF MEDICATION FOR A FEW DAYS

## 2023-10-12 NOTE — TELEPHONE ENCOUNTER
Caller: Sanaz Quiroz    Relationship: Self    Best call back number: [phone]     Requested Prescriptions:     lisinopril (PRINIVIL,ZESTRIL) 20 MG tablet            Pharmacy where request should be sent:    [facility] [id number] - [place], [place] - [facility] AT [address] - [phone]  - [phone]   Last office visit with prescribing clinician: 4/3/2023   Last telemedicine visit with prescribing clinician: Visit date not found   Next office visit with prescribing clinician: Visit date not found     Additional details provided by patient: PATIENT IS CALLING TO REQUEST A PRESCRIPTION FOR THE ABOVE MEDICATION.  SHE STATES SHE HAS BEEN OUT FOR 2 DAYS.    Does the patient have less than a 3 day supply:  [x] Yes  [] No    Would you like a call back once the refill request has been completed: [] Yes [] No    If the office needs to give you a call back, can they leave a voicemail: [] Yes [] No    Melissa Grullon, Bereniceed Rep   10/12/23 12:14 EDT     PLEASE ADVISE.

## 2023-10-13 RX ORDER — LISINOPRIL 20 MG/1
20 TABLET ORAL DAILY
Qty: 30 TABLET | Refills: 0 | Status: SHIPPED | OUTPATIENT
Start: 2023-10-13 | End: 2023-10-20 | Stop reason: SDUPTHER

## 2023-10-13 NOTE — TELEPHONE ENCOUNTER
Caller: Sanaz Quiroz    Relationship to patient: Self    Best call back number: [phone]    Patient is needing: PATIENT CALLED BACK TO CHECK STATUS. GOING OUT OF TOWN TONIGHT AND IS OUT OF MEDICATION AND HAS BEEN FOR 3 DAYS.

## 2023-10-20 ENCOUNTER — OFFICE VISIT (OUTPATIENT)
Dept: FAMILY MEDICINE CLINIC | Facility: CLINIC | Age: 58
End: 2023-10-20
Payer: COMMERCIAL

## 2023-10-20 VITALS
HEART RATE: 66 BPM | OXYGEN SATURATION: 99 % | HEIGHT: 67 IN | SYSTOLIC BLOOD PRESSURE: 130 MMHG | DIASTOLIC BLOOD PRESSURE: 80 MMHG | WEIGHT: 293 LBS | RESPIRATION RATE: 16 BRPM | BODY MASS INDEX: 45.99 KG/M2

## 2023-10-20 DIAGNOSIS — E03.9 ACQUIRED HYPOTHYROIDISM: Primary | ICD-10-CM

## 2023-10-20 DIAGNOSIS — E11.9 TYPE 2 DIABETES MELLITUS WITHOUT COMPLICATION, WITHOUT LONG-TERM CURRENT USE OF INSULIN: ICD-10-CM

## 2023-10-20 DIAGNOSIS — I89.0 LYMPHEDEMA: ICD-10-CM

## 2023-10-20 DIAGNOSIS — E55.9 VITAMIN D DEFICIENCY: ICD-10-CM

## 2023-10-20 DIAGNOSIS — B37.89 CANDIDA RASH OF GROIN: ICD-10-CM

## 2023-10-20 DIAGNOSIS — F41.9 ANXIETY: ICD-10-CM

## 2023-10-20 RX ORDER — NYSTATIN 100000 [USP'U]/G
POWDER TOPICAL 4 TIMES DAILY
Qty: 60 G | Refills: 11 | Status: SHIPPED | OUTPATIENT
Start: 2023-10-20

## 2023-10-20 RX ORDER — LISINOPRIL 20 MG/1
20 TABLET ORAL DAILY
Qty: 30 TABLET | Refills: 0 | Status: SHIPPED | OUTPATIENT
Start: 2023-10-20

## 2023-10-20 RX ORDER — ALPRAZOLAM 0.5 MG/1
0.5 TABLET ORAL 3 TIMES DAILY PRN
Qty: 30 TABLET | Refills: 0 | Status: SHIPPED | OUTPATIENT
Start: 2023-10-20

## 2023-10-20 RX ORDER — LEVOTHYROXINE SODIUM 0.2 MG/1
200 TABLET ORAL DAILY
Qty: 90 TABLET | Refills: 1 | Status: SHIPPED | OUTPATIENT
Start: 2023-10-20

## 2023-10-20 RX ORDER — MELOXICAM 7.5 MG/1
7.5 TABLET ORAL DAILY
Qty: 30 TABLET | Refills: 5 | Status: SHIPPED | OUTPATIENT
Start: 2023-10-20

## 2023-10-20 NOTE — PROGRESS NOTES
"Subjective   Sanaz Quiroz is a 57 y.o. female.     History of Present Illness    Since the last visit, she has overall felt tired.  She has Primary Hypertension and well controlled on current medication, Hypothyroidism and must update labs to continue treatment, and Vitamin D deficiency and will update labs for continued management. She has DMII and is due for labs. She is not currently on medication to treat but is interested in something to lose weight.  she has been compliant with current medications have reviewed them.  The patient denies medication side effects.  Will refill medications. /80   Pulse 66   Resp 16   Ht 170.2 cm (67\")   Wt (!) 174 kg (384 lb)   SpO2 99%   BMI 60.14 kg/m²     Results for orders placed or performed in visit on 04/03/23   Urine Culture - Urine, Urine, Clean Catch    Specimen: Urine, Clean Catch    Urine   Result Value Ref Range    Urine Culture Final report     Result 1 Comment    Comprehensive metabolic panel    Specimen: Blood   Result Value Ref Range    Glucose 145 (H) 65 - 99 mg/dL    BUN 15 6 - 20 mg/dL    Creatinine 0.78 0.57 - 1.00 mg/dL    EGFR Result 89.3 >60.0 mL/min/1.73    BUN/Creatinine Ratio 19.2 7.0 - 25.0    Sodium 141 136 - 145 mmol/L    Potassium 4.6 3.5 - 5.2 mmol/L    Chloride 103 98 - 107 mmol/L    Total CO2 26.5 22.0 - 29.0 mmol/L    Calcium 9.3 8.6 - 10.5 mg/dL    Total Protein 7.0 6.0 - 8.5 g/dL    Albumin 4.1 3.5 - 5.2 g/dL    Globulin 2.9 gm/dL    A/G Ratio 1.4 g/dL    Total Bilirubin 0.5 0.0 - 1.2 mg/dL    Alkaline Phosphatase 90 39 - 117 U/L    AST (SGOT) 12 1 - 32 U/L    ALT (SGPT) 16 1 - 33 U/L   Lipid panel    Specimen: Blood   Result Value Ref Range    Total Cholesterol 190 0 - 200 mg/dL    Triglycerides 84 0 - 150 mg/dL    HDL Cholesterol 43 40 - 60 mg/dL    VLDL Cholesterol Arnulfo 15 5 - 40 mg/dL    LDL Chol Calc (NIH) 132 (H) 0 - 100 mg/dL   TSH    Specimen: Blood   Result Value Ref Range    TSH 14.600 (H) 0.270 - 4.200 uIU/mL   T4, " free    Specimen: Blood   Result Value Ref Range    Free T4 1.13 0.93 - 1.70 ng/dL   Hemoglobin A1c    Specimen: Blood   Result Value Ref Range    Hemoglobin A1C 6.50 (H) 4.80 - 5.60 %   Vitamin D,25-Hydroxy    Specimen: Blood   Result Value Ref Range    25 Hydroxy, Vitamin D 33.3 30.0 - 100.0 ng/ml   T3, free    Specimen: Blood   Result Value Ref Range    T3, Free 2.7 2.0 - 4.4 pg/mL   POCT urinalysis dipstick, automated    Specimen: Urine   Result Value Ref Range    Color Yellow Yellow, Straw, Dark Yellow, Gertrudis    Clarity, UA Clear Clear    Specific Gravity  1.030 1.005 - 1.030    pH, Urine 5.5 5.0 - 8.0    Leukocytes Negative Negative    Nitrite, UA Negative Negative    Protein, POC Negative Negative mg/dL    Glucose, UA Negative Negative mg/dL    Ketones, UA Negative Negative    Urobilinogen, UA 0.2 E.U./dL Normal, 0.2 E.U./dL    Bilirubin Negative Negative    Blood, UA Negative Negative    Lot Number [id number]     Expiration Date 42,023    CBC and Differential    Specimen: Blood   Result Value Ref Range    WBC 6.80 3.40 - 10.80 10*3/mm3    RBC 4.72 3.77 - 5.28 10*6/mm3    Hemoglobin 13.7 12.0 - 15.9 g/dL    Hematocrit 42.2 34.0 - 46.6 %    MCV 89.4 79.0 - 97.0 fL    MCH 29.0 26.6 - 33.0 pg    MCHC 32.5 31.5 - 35.7 g/dL    RDW 14.0 12.3 - 15.4 %    Platelets 264 140 - 450 10*3/mm3    Neutrophil Rel % 59.6 42.7 - 76.0 %    Lymphocyte Rel % 28.1 19.6 - 45.3 %    Monocyte Rel % 8.7 5.0 - 12.0 %    Eosinophil Rel % 2.8 0.3 - 6.2 %    Basophil Rel % 0.4 0.0 - 1.5 %    Neutrophils Absolute 4.05 1.70 - 7.00 10*3/mm3    Lymphocytes Absolute 1.91 0.70 - 3.10 10*3/mm3    Monocytes Absolute 0.59 0.10 - 0.90 10*3/mm3    Eosinophils Absolute 0.19 0.00 - 0.40 10*3/mm3    Basophils Absolute 0.03 0.00 - 0.20 10*3/mm3    Immature Granulocyte Rel % 0.4 0.0 - 0.5 %    Immature Grans Absolute 0.03 0.00 - 0.05 10*3/mm3    nRBC 0.0 0.0 - 0.2 /100 WBC     She also has alprazolam that she uses as needed.  This was last filled in April  for 30 with no refills and she is just now due.     The following portions of the patient's history were reviewed and updated as appropriate: allergies, current medications, past family history, past medical history, past social history, past surgical history, and problem list.    Review of Systems   Constitutional:  Positive for fatigue and unexpected weight change.   Cardiovascular:  Negative for chest pain and palpitations.   Endocrine: Negative for cold intolerance, heat intolerance, polydipsia, polyphagia and polyuria.   Psychiatric/Behavioral:  Negative for behavioral problems.        Objective   Physical Exam  Vitals and nursing note reviewed.   Constitutional:       Appearance: She is well-developed.   Neck:      Thyroid: No thyromegaly.      Vascular: No carotid bruit.   Cardiovascular:      Rate and Rhythm: Normal rate and regular rhythm.   Pulmonary:      Effort: Pulmonary effort is normal.      Breath sounds: Normal breath sounds.   Neurological:      Mental Status: She is alert and oriented to person, place, and time.   Psychiatric:         Mood and Affect: Mood normal.         Behavior: Behavior normal.         Thought Content: Thought content normal.         Judgment: Judgment normal.         Assessment & Plan   Diagnoses and all orders for this visit:    1. Acquired hypothyroidism (Primary)  -     Comprehensive metabolic panel  -     Lipid panel  -     CBC and Differential  -     TSH  -     T4, free  -     T3, free  -     Hemoglobin A1c  -     MicroAlbumin, Urine, Random - Urine, Clean Catch  -     levothyroxine (SYNTHROID, LEVOTHROID) 200 MCG tablet; Take 1 tablet by mouth Daily.  Dispense: 90 tablet; Refill: 1    2. Type 2 diabetes mellitus without complication, without long-term current use of insulin  -     Comprehensive metabolic panel  -     Lipid panel  -     CBC and Differential  -     TSH  -     T4, free  -     T3, free  -     Hemoglobin A1c  -     MicroAlbumin, Urine, Random - Urine, Clean  Catch  -     Semaglutide,0.25 or 0.5MG/DOS, (OZEMPIC) 2 MG/3ML solution pen-injector; Inject 0.25 mg under the skin into the appropriate area as directed 1 (One) Time Per Week.  Dispense: 3 mL; Refill: 2    3. Vitamin D deficiency  -     Comprehensive metabolic panel  -     Lipid panel  -     CBC and Differential  -     TSH  -     T4, free  -     T3, free  -     Hemoglobin A1c  -     MicroAlbumin, Urine, Random - Urine, Clean Catch  -     Cholecalciferol 1.25 MG (36971 UT) tablet; Take 1 tablet by mouth 1 (One) Time Per Week.  Dispense: 26 tablet; Refill: 3    4. Body mass index (BMI) of 60.0 to 69.9 in adult  -     Semaglutide,0.25 or 0.5MG/DOS, (OZEMPIC) 2 MG/3ML solution pen-injector; Inject 0.25 mg under the skin into the appropriate area as directed 1 (One) Time Per Week.  Dispense: 3 mL; Refill: 2    5. Anxiety  -     ALPRAZolam (XANAX) 0.5 MG tablet; Take 1 tablet by mouth 3 (Three) Times a Day As Needed for Anxiety.  Dispense: 30 tablet; Refill: 0    6. Candida rash of groin  -     nystatin (MYCOSTATIN) 366651 UNIT/GM powder; Apply  topically to the appropriate area as directed 4 (Four) Times a Day.  Dispense: 60 g; Refill: 11    7. Lymphedema  -     Elastic Bandages & Supports (Jobst Relief 20-30mmHg Lg) misc; Use 1 each Daily.  Dispense: 1 each; Refill: 0    Other orders  -     lisinopril (PRINIVIL,ZESTRIL) 20 MG tablet; Take 1 tablet by mouth Daily.  Dispense: 30 tablet; Refill: 0  -     meloxicam (Mobic) 7.5 MG tablet; Take 1 tablet by mouth Daily.  Dispense: 30 tablet; Refill: 5          ELIDIA rodríguez.

## 2023-12-14 ENCOUNTER — TELEPHONE (OUTPATIENT)
Dept: FAMILY MEDICINE CLINIC | Facility: CLINIC | Age: 58
End: 2023-12-14

## 2023-12-14 NOTE — TELEPHONE ENCOUNTER
Caller: NICKI    Relationship:     Best call back number: [phone]     What is the best time to reach you: ANY    Who are you requesting to speak with (clinical staff, provider,  specific staff member): CLINICAL STAFF    Do you know the name of the person who called:      What was the call regarding: NICKI FROM [facility] CALLED STATED NOTICED PATIENT MAY HAVE DIABETES BUT NOT ON STATIN.  PER THE CURRENT ADA GUIDELINES, ACC, AHA CHOLESTEROL GUIDELINES RECOMMENDATIONS.  PLEASE CONSIDER EVALUATING THERAPY FOR THE PATIENT AND IF APPROPRIATE TO PRESCRIBE A STATIN MEDICATION FOR THE PATIENT.  ALSO, ASKING THE PROVIDER IF APPROPRIATE TO PLEASE SEND A PRESCRIPTION TO THE PATIENT PHARMACY AS SOON AS POSSIBLE.      Is it okay if the provider responds through MyChart:              Show Applicator Variable?: Yes Aperture Size (Optional): C Consent: The patient's consent was obtained including but not limited to risks of crusting, scabbing, blistering, scarring, darker or lighter pigmentary change, recurrence, incomplete removal and infection. Duration Of Freeze Thaw-Cycle (Seconds): 4 Number Of Freeze-Thaw Cycles: 2 freeze-thaw cycles Render Note In Bullet Format When Appropriate: No Application Tool (Optional): Liquid Nitrogen Sprayer Post-Care Instructions: I reviewed with the patient in detail post-care instructions. Patient is to wear sunprotection, and avoid picking at any of the treated lesions. Pt may apply Vaseline to crusted or scabbing areas. Detail Level: Detailed

## 2023-12-29 ENCOUNTER — TELEPHONE (OUTPATIENT)
Dept: FAMILY MEDICINE CLINIC | Facility: CLINIC | Age: 58
End: 2023-12-29

## 2023-12-29 NOTE — TELEPHONE ENCOUNTER
DELETE AFTER REVIEWING: Telephone encounter to be sent to the clinical pool.    Caller: KASIA    Relationship:     Best call back number: [phone]     What form or medical record are you requesting:      Who is requesting this form or medical record from you:      How would you like to receive the form or medical records (pick-up, mail, fax):    If fax, what is the fax number:    If mail, what is the address:    If pick-up, provide patient with address and location details    Timeframe paperwork needed:      Additional notes:  KASIA FROM [facility] STATED FAXED AT BEGINNING OF DECEMBER  WAS EFAXED IN REGARDS TO [facility].  HAVE YOU RECEIVED THIS FORM AND IF SO HAS IT BEEN COMPLETED. NEEDING RETURNED BY 12/31/23.

## 2023-12-31 RX ORDER — LISINOPRIL 20 MG/1
20 TABLET ORAL DAILY
Qty: 30 TABLET | Refills: 0 | Status: SHIPPED | OUTPATIENT
Start: 2023-12-31

## 2024-03-14 RX ORDER — LISINOPRIL 20 MG/1
20 TABLET ORAL DAILY
Qty: 14 TABLET | Refills: 0 | Status: SHIPPED | OUTPATIENT
Start: 2024-03-14

## 2024-03-14 NOTE — TELEPHONE ENCOUNTER
Last seen 10/2023 /  Return in about 3 months (around 1/20/2024), or if symptoms worsen or fail to improve, for Next scheduled follow up.

## 2024-03-14 NOTE — TELEPHONE ENCOUNTER
Caller: Sanaz Quiroz    Relationship: Self    Best call back number: [phone]    Requested Prescriptions:   Requested Prescriptions     Pending Prescriptions Disp Refills    lisinopril (PRINIVIL,ZESTRIL) 20 MG tablet 30 tablet 0     Sig: Take 1 tablet by mouth Daily.        Pharmacy where request should be sent: [facility] [id number] [place] [id number] [facility] [phone] [facility] [phone]      Last office visit with prescribing clinician: 10/20/2023   Last telemedicine visit with prescribing clinician: Visit date not found   Next office visit with prescribing clinician: Visit date not found     Additional details provided by patient: COMPLETELY OUT OF MEDICATION    Does the patient have less than a 3 day supply:  [x] Yes  [] No    Would you like a call back once the refill request has been completed: [] Yes [x] No    If the office needs to give you a call back, can they leave a voicemail: [] Yes [x] No    Víctor Mackey Rep   03/14/24 11:59 EDT

## 2024-03-28 ENCOUNTER — OFFICE VISIT (OUTPATIENT)
Dept: FAMILY MEDICINE CLINIC | Facility: CLINIC | Age: 59
End: 2024-03-28
Payer: COMMERCIAL

## 2024-03-28 VITALS
WEIGHT: 293 LBS | SYSTOLIC BLOOD PRESSURE: 120 MMHG | HEART RATE: 86 BPM | BODY MASS INDEX: 45.99 KG/M2 | OXYGEN SATURATION: 98 % | HEIGHT: 67 IN | RESPIRATION RATE: 16 BRPM | DIASTOLIC BLOOD PRESSURE: 82 MMHG

## 2024-03-28 DIAGNOSIS — Z79.899 MEDICATION MANAGEMENT: ICD-10-CM

## 2024-03-28 DIAGNOSIS — E11.9 TYPE 2 DIABETES MELLITUS WITHOUT COMPLICATION, WITHOUT LONG-TERM CURRENT USE OF INSULIN: ICD-10-CM

## 2024-03-28 DIAGNOSIS — E55.9 VITAMIN D DEFICIENCY: ICD-10-CM

## 2024-03-28 DIAGNOSIS — F41.9 ANXIETY: ICD-10-CM

## 2024-03-28 DIAGNOSIS — B37.89 CANDIDA RASH OF GROIN: ICD-10-CM

## 2024-03-28 DIAGNOSIS — E03.9 ACQUIRED HYPOTHYROIDISM: ICD-10-CM

## 2024-03-28 DIAGNOSIS — Z12.31 ENCOUNTER FOR SCREENING MAMMOGRAM FOR MALIGNANT NEOPLASM OF BREAST: ICD-10-CM

## 2024-03-28 DIAGNOSIS — Z12.12 SCREENING FOR COLORECTAL CANCER: Primary | ICD-10-CM

## 2024-03-28 DIAGNOSIS — Z12.11 SCREENING FOR COLORECTAL CANCER: Primary | ICD-10-CM

## 2024-03-28 RX ORDER — MELOXICAM 7.5 MG/1
7.5 TABLET ORAL DAILY
Qty: 30 TABLET | Refills: 5 | Status: SHIPPED | OUTPATIENT
Start: 2024-03-28

## 2024-03-28 RX ORDER — LISINOPRIL 20 MG/1
20 TABLET ORAL DAILY
Qty: 90 TABLET | Refills: 1 | Status: SHIPPED | OUTPATIENT
Start: 2024-03-28

## 2024-03-28 RX ORDER — LEVOTHYROXINE SODIUM 0.2 MG/1
200 TABLET ORAL DAILY
Qty: 90 TABLET | Refills: 1 | Status: SHIPPED | OUTPATIENT
Start: 2024-03-28

## 2024-03-28 RX ORDER — ALPRAZOLAM 0.5 MG/1
0.5 TABLET ORAL 3 TIMES DAILY PRN
Qty: 30 TABLET | Refills: 0 | Status: SHIPPED | OUTPATIENT
Start: 2024-03-28

## 2024-03-28 NOTE — PROGRESS NOTES
"Subjective   Sanaz Quiroz is a 58 y.o. female.     History of Present Illness    Since the last visit, she has overall felt well.  She has Primary Hypertension and well controlled on current medication and DMII and hyperlipidemia and is due for labs.  She will schedule lab appt .  she has been compliant with current medications have reviewed them.  The patient denies medication side effects.  Will refill medications. /82   Pulse 86   Resp 16   Ht 170.2 cm (67\")   Wt (!) 174 kg (383 lb)   SpO2 98%   BMI 59.99 kg/m²     Results for orders placed or performed in visit on 04/03/23   Urine Culture - Urine, Urine, Clean Catch    Specimen: Urine, Clean Catch    Urine   Result Value Ref Range    Urine Culture Final report     Result 1 Comment    Comprehensive metabolic panel    Specimen: Blood   Result Value Ref Range    Glucose 145 (H) 65 - 99 mg/dL    BUN 15 6 - 20 mg/dL    Creatinine 0.78 0.57 - 1.00 mg/dL    EGFR Result 89.3 >60.0 mL/min/1.73    BUN/Creatinine Ratio 19.2 7.0 - 25.0    Sodium 141 136 - 145 mmol/L    Potassium 4.6 3.5 - 5.2 mmol/L    Chloride 103 98 - 107 mmol/L    Total CO2 26.5 22.0 - 29.0 mmol/L    Calcium 9.3 8.6 - 10.5 mg/dL    Total Protein 7.0 6.0 - 8.5 g/dL    Albumin 4.1 3.5 - 5.2 g/dL    Globulin 2.9 gm/dL    A/G Ratio 1.4 g/dL    Total Bilirubin 0.5 0.0 - 1.2 mg/dL    Alkaline Phosphatase 90 39 - 117 U/L    AST (SGOT) 12 1 - 32 U/L    ALT (SGPT) 16 1 - 33 U/L   Lipid panel    Specimen: Blood   Result Value Ref Range    Total Cholesterol 190 0 - 200 mg/dL    Triglycerides 84 0 - 150 mg/dL    HDL Cholesterol 43 40 - 60 mg/dL    VLDL Cholesterol Arnulfo 15 5 - 40 mg/dL    LDL Chol Calc (NIH) 132 (H) 0 - 100 mg/dL   TSH    Specimen: Blood   Result Value Ref Range    TSH 14.600 (H) 0.270 - 4.200 uIU/mL   T4, free    Specimen: Blood   Result Value Ref Range    Free T4 1.13 0.93 - 1.70 ng/dL   Hemoglobin A1c    Specimen: Blood   Result Value Ref Range    Hemoglobin A1C 6.50 (H) 4.80 - 5.60 " %   Vitamin D,25-Hydroxy    Specimen: Blood   Result Value Ref Range    25 Hydroxy, Vitamin D 33.3 30.0 - 100.0 ng/ml   T3, free    Specimen: Blood   Result Value Ref Range    T3, Free 2.7 2.0 - 4.4 pg/mL   POCT urinalysis dipstick, automated    Specimen: Urine   Result Value Ref Range    Color Yellow Yellow, Straw, Dark Yellow, Gertrudis    Clarity, UA Clear Clear    Specific Gravity  1.030 1.005 - 1.030    pH, Urine 5.5 5.0 - 8.0    Leukocytes Negative Negative    Nitrite, UA Negative Negative    Protein, POC Negative Negative mg/dL    Glucose, UA Negative Negative mg/dL    Ketones, UA Negative Negative    Urobilinogen, UA 0.2 E.U./dL Normal, 0.2 E.U./dL    Bilirubin Negative Negative    Blood, UA Negative Negative    Lot Number [id number]     Expiration Date 42,023    CBC and Differential    Specimen: Blood   Result Value Ref Range    WBC 6.80 3.40 - 10.80 10*3/mm3    RBC 4.72 3.77 - 5.28 10*6/mm3    Hemoglobin 13.7 12.0 - 15.9 g/dL    Hematocrit 42.2 34.0 - 46.6 %    MCV 89.4 79.0 - 97.0 fL    MCH 29.0 26.6 - 33.0 pg    MCHC 32.5 31.5 - 35.7 g/dL    RDW 14.0 12.3 - 15.4 %    Platelets 264 140 - 450 10*3/mm3    Neutrophil Rel % 59.6 42.7 - 76.0 %    Lymphocyte Rel % 28.1 19.6 - 45.3 %    Monocyte Rel % 8.7 5.0 - 12.0 %    Eosinophil Rel % 2.8 0.3 - 6.2 %    Basophil Rel % 0.4 0.0 - 1.5 %    Neutrophils Absolute 4.05 1.70 - 7.00 10*3/mm3    Lymphocytes Absolute 1.91 0.70 - 3.10 10*3/mm3    Monocytes Absolute 0.59 0.10 - 0.90 10*3/mm3    Eosinophils Absolute 0.19 0.00 - 0.40 10*3/mm3    Basophils Absolute 0.03 0.00 - 0.20 10*3/mm3    Immature Granulocyte Rel % 0.4 0.0 - 0.5 %    Immature Grans Absolute 0.03 0.00 - 0.05 10*3/mm3    nRBC 0.0 0.0 - 0.2 /100 WBC     Last colonoscopy was 2014 with 10 year recall per records in care everywhere.    She is past due for mammogram.     The following portions of the patient's history were reviewed and updated as appropriate: allergies, current medications, past family history, past  medical history, past social history, past surgical history, and problem list.    Review of Systems   Constitutional:  Negative for unexpected weight change.   Respiratory:  Negative for shortness of breath.    Cardiovascular:  Negative for chest pain and palpitations.   Endocrine: Negative for cold intolerance, heat intolerance, polydipsia, polyphagia and polyuria.   Psychiatric/Behavioral:  Negative for behavioral problems.        Objective   Physical Exam  Vitals and nursing note reviewed.   Constitutional:       Appearance: Normal appearance. She is well-developed.   Neck:      Vascular: No carotid bruit.   Cardiovascular:      Rate and Rhythm: Normal rate and regular rhythm.   Pulmonary:      Effort: Pulmonary effort is normal.      Breath sounds: Normal breath sounds.   Neurological:      Mental Status: She is alert and oriented to person, place, and time.   Psychiatric:         Mood and Affect: Mood normal.         Behavior: Behavior normal.         Thought Content: Thought content normal.         Judgment: Judgment normal.         Assessment & Plan   Diagnoses and all orders for this visit:    1. Screening for colorectal cancer (Primary)  -     Ambulatory Referral For Screening Colonoscopy    2. Type 2 diabetes mellitus without complication, without long-term current use of insulin  -     Semaglutide,0.25 or 0.5MG/DOS, (OZEMPIC) 2 MG/3ML solution pen-injector; Inject 0.25 mg under the skin into the appropriate area as directed 1 (One) Time Per Week.  Dispense: 3 mL; Refill: 2    3. Body mass index (BMI) of 60.0 to 69.9 in adult  -     Semaglutide,0.25 or 0.5MG/DOS, (OZEMPIC) 2 MG/3ML solution pen-injector; Inject 0.25 mg under the skin into the appropriate area as directed 1 (One) Time Per Week.  Dispense: 3 mL; Refill: 2    4. Vitamin D deficiency  -     Cholecalciferol 1.25 MG (43281 UT) tablet; Take 1 tablet by mouth 1 (One) Time Per Week.  Dispense: 13 tablet; Refill: 3    5. Acquired hypothyroidism  -      levothyroxine (SYNTHROID, LEVOTHROID) 200 MCG tablet; Take 1 tablet by mouth Daily.  Dispense: 90 tablet; Refill: 1    6. Candida rash of groin    7. Anxiety  -     ALPRAZolam (XANAX) 0.5 MG tablet; Take 1 tablet by mouth 3 (Three) Times a Day As Needed for Anxiety.  Dispense: 30 tablet; Refill: 0    8. Encounter for screening mammogram for malignant neoplasm of breast  -     Mammo screening digital tomosynthesis bilateral w CAD    9. Medication management  -     ToxAssure Flex 14, Urine - Urine, Clean Catch    Other orders  -     lisinopril (PRINIVIL,ZESTRIL) 20 MG tablet; Take 1 tablet by mouth Daily.  Dispense: 90 tablet; Refill: 1  -     meloxicam (Mobic) 7.5 MG tablet; Take 1 tablet by mouth Daily.  Dispense: 30 tablet; Refill: 5      ELIDIA reviewed. Urine tox screen ordered.

## 2024-04-04 ENCOUNTER — PRIOR AUTHORIZATION (OUTPATIENT)
Dept: FAMILY MEDICINE CLINIC | Facility: CLINIC | Age: 59
End: 2024-04-04
Payer: COMMERCIAL

## 2024-04-06 LAB
ALBUMIN SERPL-MCNC: 4.3 G/DL (ref 3.8–4.9)
ALBUMIN/GLOB SERPL: 1.4 {RATIO} (ref 1.2–2.2)
ALP SERPL-CCNC: 96 IU/L (ref 44–121)
ALT SERPL-CCNC: 18 IU/L (ref 0–32)
AST SERPL-CCNC: 13 IU/L (ref 0–40)
BASOPHILS # BLD AUTO: 0 X10E3/UL (ref 0–0.2)
BASOPHILS NFR BLD AUTO: 1 %
BILIRUB SERPL-MCNC: 0.6 MG/DL (ref 0–1.2)
BUN SERPL-MCNC: 13 MG/DL (ref 6–24)
BUN/CREAT SERPL: 14 (ref 9–23)
CALCIUM SERPL-MCNC: 9.1 MG/DL (ref 8.7–10.2)
CHLORIDE SERPL-SCNC: 100 MMOL/L (ref 96–106)
CHOLEST SERPL-MCNC: 225 MG/DL (ref 100–199)
CO2 SERPL-SCNC: 26 MMOL/L (ref 20–29)
CREAT SERPL-MCNC: 0.91 MG/DL (ref 0.57–1)
EGFRCR SERPLBLD CKD-EPI 2021: 74 ML/MIN/1.73
EOSINOPHIL # BLD AUTO: 0.2 X10E3/UL (ref 0–0.4)
EOSINOPHIL NFR BLD AUTO: 3 %
ERYTHROCYTE [DISTWIDTH] IN BLOOD BY AUTOMATED COUNT: 13.9 % (ref 11.7–15.4)
GLOBULIN SER CALC-MCNC: 3 G/DL (ref 1.5–4.5)
GLUCOSE SERPL-MCNC: 143 MG/DL (ref 70–99)
HBA1C MFR BLD: 6.9 % (ref 4.8–5.6)
HCT VFR BLD AUTO: 46.3 % (ref 34–46.6)
HDLC SERPL-MCNC: 46 MG/DL
HGB BLD-MCNC: 14.9 G/DL (ref 11.1–15.9)
IMM GRANULOCYTES # BLD AUTO: 0.1 X10E3/UL (ref 0–0.1)
IMM GRANULOCYTES NFR BLD AUTO: 1 %
LDLC SERPL CALC-MCNC: 165 MG/DL (ref 0–99)
LYMPHOCYTES # BLD AUTO: 1.7 X10E3/UL (ref 0.7–3.1)
LYMPHOCYTES NFR BLD AUTO: 24 %
MCH RBC QN AUTO: 29.3 PG (ref 26.6–33)
MCHC RBC AUTO-ENTMCNC: 32.2 G/DL (ref 31.5–35.7)
MCV RBC AUTO: 91 FL (ref 79–97)
MICROALBUMIN UR-MCNC: 63 UG/ML
MONOCYTES # BLD AUTO: 0.6 X10E3/UL (ref 0.1–0.9)
MONOCYTES NFR BLD AUTO: 8 %
NEUTROPHILS # BLD AUTO: 4.6 X10E3/UL (ref 1.4–7)
NEUTROPHILS NFR BLD AUTO: 63 %
PLATELET # BLD AUTO: 265 X10E3/UL (ref 150–450)
POTASSIUM SERPL-SCNC: 4.8 MMOL/L (ref 3.5–5.2)
PROT SERPL-MCNC: 7.3 G/DL (ref 6–8.5)
RBC # BLD AUTO: 5.08 X10E6/UL (ref 3.77–5.28)
SODIUM SERPL-SCNC: 138 MMOL/L (ref 134–144)
T3FREE SERPL-MCNC: 3.1 PG/ML (ref 2–4.4)
T4 FREE SERPL-MCNC: 0.91 NG/DL (ref 0.82–1.77)
TRIGL SERPL-MCNC: 78 MG/DL (ref 0–149)
TSH SERPL DL<=0.005 MIU/L-ACNC: 33.6 UIU/ML (ref 0.45–4.5)
VLDLC SERPL CALC-MCNC: 14 MG/DL (ref 5–40)
WBC # BLD AUTO: 7.2 X10E3/UL (ref 3.4–10.8)

## 2024-04-08 RX ORDER — LEVOTHYROXINE SODIUM 0.03 MG/1
25 TABLET ORAL
Qty: 90 TABLET | Refills: 1 | Status: SHIPPED | OUTPATIENT
Start: 2024-04-08

## 2024-04-09 ENCOUNTER — TELEPHONE (OUTPATIENT)
Dept: FAMILY MEDICINE CLINIC | Facility: CLINIC | Age: 59
End: 2024-04-09
Payer: COMMERCIAL

## 2024-04-09 DIAGNOSIS — E03.9 ACQUIRED HYPOTHYROIDISM: Primary | ICD-10-CM

## 2024-04-09 LAB
1OH-MIDAZOLAM UR QL SCN: NOT DETECTED NG/MG CREAT
6MAM UR QL SCN: NEGATIVE NG/ML
7AMINOCLONAZEPAM/CREAT UR: NOT DETECTED NG/MG CREAT
A-OH ALPRAZ/CREAT UR: NOT DETECTED NG/MG CREAT
A-OH-TRIAZOLAM/CREAT UR CFM: NOT DETECTED NG/MG CREAT
ALPRAZ/CREAT UR CFM: NOT DETECTED NG/MG CREAT
AMPHETAMINES UR QL SCN: NEGATIVE NG/ML
BARBITURATES UR QL SCN: NEGATIVE NG/ML
BENZODIAZ SCN METH UR: NEGATIVE
BUPRENORPHINE UR QL SCN: NEGATIVE
BUPRENORPHINE/CREAT UR: NOT DETECTED NG/MG CREAT
CLONAZEPAM/CREAT UR CFM: NOT DETECTED NG/MG CREAT
COCAINE+BZE UR QL SCN: NEGATIVE NG/ML
CREAT UR-MCNC: 85 MG/DL
DESALKYLFLURAZ/CREAT UR: NOT DETECTED NG/MG CREAT
DIAZEPAM/CREAT UR: NOT DETECTED NG/MG CREAT
ETHANOL UR QL SCN: NEGATIVE G/DL
FENTANYL CTO UR SCN-MCNC: NEGATIVE NG/ML
FENTANYL/CREAT UR: NOT DETECTED NG/MG CREAT
FLUNITRAZEPAM UR QL SCN: NOT DETECTED NG/MG CREAT
LORAZEPAM/CREAT UR: NOT DETECTED NG/MG CREAT
METHADONE UR QL SCN: NEGATIVE NG/ML
METHADONE+METAB UR QL SCN: NEGATIVE NG/ML
MIDAZOLAM/CREAT UR CFM: NOT DETECTED NG/MG CREAT
NORBUPRENORPHINE/CREAT UR: NOT DETECTED NG/MG CREAT
NORDIAZEPAM/CREAT UR: NOT DETECTED NG/MG CREAT
NORFENTANYL/CREAT UR: NOT DETECTED NG/MG CREAT
NORFLUNITRAZEPAM UR-MCNC: NOT DETECTED NG/MG CREAT
OPIATES UR SCN-MCNC: NEGATIVE NG/ML
OXAZEPAM/CREAT UR: NOT DETECTED NG/MG CREAT
OXYCODONE CTO UR SCN-MCNC: NEGATIVE NG/ML
PCP UR QL SCN: NEGATIVE NG/ML
PRESCRIBED MEDICATIONS: NORMAL
TAPENTADOL CTO UR SCN-MCNC: NEGATIVE NG/ML
TEMAZEPAM/CREAT UR: NOT DETECTED NG/MG CREAT
TRAMADOL UR QL SCN: NEGATIVE NG/ML

## 2024-04-09 NOTE — TELEPHONE ENCOUNTER
----- Message from GARY Mckinnon ([place]) sent at 4/8/2024  9:12 AM EDT -----  Free T4 level is decreasing.  Recommend adding the extra 25 mcg of levothyroxine back in addition to the 200 mcg dose she is on.  Will recheck thyroid labs in 8 weeks.

## 2024-08-29 DIAGNOSIS — E11.9 TYPE 2 DIABETES MELLITUS WITHOUT COMPLICATION, WITHOUT LONG-TERM CURRENT USE OF INSULIN: ICD-10-CM

## 2024-08-29 RX ORDER — SEMAGLUTIDE 0.68 MG/ML
INJECTION, SOLUTION SUBCUTANEOUS
Qty: 3 ML | Refills: 0 | Status: SHIPPED | OUTPATIENT
Start: 2024-08-29

## 2024-10-14 DIAGNOSIS — F41.9 ANXIETY: ICD-10-CM

## 2024-10-14 DIAGNOSIS — E03.9 ACQUIRED HYPOTHYROIDISM: ICD-10-CM

## 2024-10-14 DIAGNOSIS — E11.9 TYPE 2 DIABETES MELLITUS WITHOUT COMPLICATION, WITHOUT LONG-TERM CURRENT USE OF INSULIN: ICD-10-CM

## 2024-10-14 NOTE — TELEPHONE ENCOUNTER
Caller: Sanaz Quiroz    Relationship: Self    Best call back number: [phone]     Requested Prescriptions:   Requested Prescriptions     Pending Prescriptions Disp Refills    lisinopril (PRINIVIL,ZESTRIL) 20 MG tablet 90 tablet 1     Sig: Take 1 tablet by mouth Daily.    Semaglutide,0.25 or 0.5MG/DOS, (Ozempic, 0.25 or 0.5 MG/DOSE,) 2 MG/3ML solution pen-injector 3 mL 0    ALPRAZolam (XANAX) 0.5 MG tablet 30 tablet 0     Sig: Take 1 tablet by mouth 3 (Three) Times a Day As Needed for Anxiety.    levothyroxine (SYNTHROID, LEVOTHROID) 200 MCG tablet 90 tablet 1     Sig: Take 1 tablet by mouth Daily.    ALSO NEEDS VITAMIN D REFILLED    Pharmacy where request should be sent: [facility] [id number] [facility] [address] [phone] [facility] [phone]      Last office visit with prescribing clinician: 3/28/2024   Last telemedicine visit with prescribing clinician: Visit date not found   Next office visit with prescribing clinician: Visit date not found     Additional details provided by patient: PATIENT IS ALSO REQUESTING HER VITAMIN D BE REFILLED.    PATIENT STATED SHE HAS ABOUT A WEEK REMAINING OF THESE MEDICATIONS.    Does the patient have less than a 3 day supply:  [] Yes  [x] No    Would you like a call back once the refill request has been completed: [] Yes [x] No    If the office needs to give you a call back, can they leave a voicemail: [] Yes [x] No    Víctor Salguero Rep   10/14/24 09:07 EDT

## 2024-10-17 ENCOUNTER — TELEPHONE (OUTPATIENT)
Dept: FAMILY MEDICINE CLINIC | Facility: CLINIC | Age: 59
End: 2024-10-17
Payer: COMMERCIAL

## 2024-10-17 RX ORDER — ALPRAZOLAM 0.5 MG
0.5 TABLET ORAL 3 TIMES DAILY PRN
Qty: 30 TABLET | Refills: 0 | OUTPATIENT
Start: 2024-10-17

## 2024-10-17 RX ORDER — LEVOTHYROXINE SODIUM 200 UG/1
200 TABLET ORAL DAILY
Qty: 30 TABLET | Refills: 0 | OUTPATIENT
Start: 2024-10-17

## 2024-10-17 RX ORDER — SEMAGLUTIDE 0.68 MG/ML
0.25 INJECTION, SOLUTION SUBCUTANEOUS WEEKLY
Qty: 3 ML | Refills: 0 | OUTPATIENT
Start: 2024-10-17

## 2024-10-17 RX ORDER — LISINOPRIL 20 MG/1
20 TABLET ORAL DAILY
Qty: 30 TABLET | Refills: 0 | OUTPATIENT
Start: 2024-10-17

## 2024-11-01 ENCOUNTER — OFFICE VISIT (OUTPATIENT)
Dept: FAMILY MEDICINE CLINIC | Facility: CLINIC | Age: 59
End: 2024-11-01
Payer: COMMERCIAL

## 2024-11-01 ENCOUNTER — PRIOR AUTHORIZATION (OUTPATIENT)
Dept: FAMILY MEDICINE CLINIC | Facility: CLINIC | Age: 59
End: 2024-11-01
Payer: COMMERCIAL

## 2024-11-01 VITALS
DIASTOLIC BLOOD PRESSURE: 90 MMHG | HEIGHT: 67 IN | OXYGEN SATURATION: 98 % | HEART RATE: 92 BPM | SYSTOLIC BLOOD PRESSURE: 140 MMHG | TEMPERATURE: 98.5 F | RESPIRATION RATE: 16 BRPM | BODY MASS INDEX: 45.99 KG/M2 | WEIGHT: 293 LBS

## 2024-11-01 DIAGNOSIS — E55.9 VITAMIN D DEFICIENCY: ICD-10-CM

## 2024-11-01 DIAGNOSIS — E03.9 ACQUIRED HYPOTHYROIDISM: ICD-10-CM

## 2024-11-01 DIAGNOSIS — E11.9 TYPE 2 DIABETES MELLITUS WITHOUT COMPLICATION, WITHOUT LONG-TERM CURRENT USE OF INSULIN: Primary | ICD-10-CM

## 2024-11-01 RX ORDER — SEMAGLUTIDE 0.68 MG/ML
0.5 INJECTION, SOLUTION SUBCUTANEOUS WEEKLY
Qty: 3 ML | Refills: 0 | Status: SHIPPED | OUTPATIENT
Start: 2024-11-01

## 2024-11-01 RX ORDER — LISINOPRIL 20 MG/1
20 TABLET ORAL DAILY
Qty: 90 TABLET | Refills: 1 | Status: SHIPPED | OUTPATIENT
Start: 2024-11-01

## 2024-11-01 RX ORDER — MELOXICAM 7.5 MG/1
7.5 TABLET ORAL DAILY
Qty: 30 TABLET | Refills: 5 | Status: SHIPPED | OUTPATIENT
Start: 2024-11-01

## 2024-11-01 RX ORDER — LEVOTHYROXINE SODIUM 200 UG/1
200 TABLET ORAL DAILY
Qty: 90 TABLET | Refills: 1 | Status: SHIPPED | OUTPATIENT
Start: 2024-11-01

## 2024-11-01 NOTE — TELEPHONE ENCOUNTER
Semaglutide,0.25 or 0.5MG/DOS, (Ozempic, 0.25 or 0.5 MG/DOSE,) 2 MG/3ML solution pen-injector     Note from payer: This medication or product was previously approved on [id number] from 2024-04-01 to 2025-04-01. **Please note: This request was submitted electronically. Formulary lowering, tiering exception, cost reduction and/or pre-benefit determination review (including prospective Medicare hospice reviews) requests cannot be requested using this method of submission. Providers contact us at [phone] for further assistance.   Payer: [employer] Patient's Payer Case ID: [id number]    [phone]

## 2024-11-01 NOTE — PROGRESS NOTES
"Subjective   Sanaz Quiroz is a 58 y.o. female.     Hypothyroidism  Patient reports no palpitations.   Hypertension  Associated symptoms include anxiety. Pertinent negatives include no chest pain, palpitations or shortness of breath.   Anxiety   Patient reports no chest pain, palpitations or shortness of breath.   Diabetes  Pertinent negatives for diabetes include no chest pain.      Chief Complaint     Hypothyroidism  Hypertension  Anxiety  Diabetes   Since the last visit, she has overall felt  achy.  Has some bilateral knee pain, back pain and left thumb pain .  She has  HTN, hyperlipidemia, hypothyroidism, DMII and is due for labs to assess control .  she has been compliant with current medications have reviewed them.  The patient denies medication side effects.  Will refill medications. /90   Pulse 92   Temp 98.5 °F (36.9 °C)   Resp 16   Ht 170.2 cm (67\")   Wt (!) 176 kg (388 lb)   SpO2 98%   BMI 60.77 kg/m² .          Results for orders placed or performed in visit on 03/28/24   ToxAssure Flex 14, Urine - Urine, Clean Catch    Collection Time: 04/05/24  9:15 AM    Specimen: Urine, Clean Catch   Result Value Ref Range    Report Summary FINAL     CREATININE 85 mg/dL    Amphetamines, IA Negative CUTOFF:300 ng/mL    Benzodiazepines Negative     Diazepam Urine, Qualitative Not Detected ng/mg creat    Desmethyldiazepam Not Detected ng/mg creat    Oxazepam, urine Not Detected ng/mg creat    Temazepam Not Detected ng/mg creat    Alprazolam Urine, Conf Not Detected ng/mg creat    Alpha-hydroxyalprazolam, Urine Not Detected ng/mg creat    Desalkylflurazepam, Urine Not Detected ng/mg creat    Lorazepam, Urine Not Detected ng/mg creat    Alpha-hydroxytriazolam, Urine Not Detected ng/mg creat    Clonazepam Not Detected ng/mg creat    7- AMINOCLONAZEPAM Not Detected ng/mg creat    Midazolam, Urine Not Detected ng/mg creat    Alpha-hydroxymidazolam, Urine Not Detected ng/mg creat    Flunitrazepam Not Detected " ng/mg creat    DESMETHYLFLUNITRAZEPAM Not Detected ng/mg creat    COCAINE / METABOLITE, IA Negative CUTOFF:150 ng/mL    Ethyl Alcohol, Enz Negative CUTOFF:0.020 g/dL    6-Acetylmorphine IA Negative CUTOFF:10 ng/mL    Opiate Class IA Negative CUTOFF:100 ng/mL    Oxycodone Class IA Negative CUTOFF:100 ng/mL    Methadone MTB IA Negative CUTOFF:100 ng/mL    METHADONE, IA Negative CUTOFF:100 ng/mL    Buprenorphine, Urine Negative     Buprenorphine, Urine Not Detected ng/mg creat    Norbuprenorphine Not Detected ng/mg creat    Fentanyl Negative     Fentanyl, Urine Not Detected ng/mg creat    Norfentanyl Urine Not Detected ng/mg creat    Tapentadol, IA Negative CUTOFF:200 ng/mL    TRAMADOL, IA Negative CUTOFF:200 ng/mL    Barbiturates, IA Negative CUTOFF:200 ng/mL    PHENCYCLIDINE, IA Negative CUTOFF:25 ng/mL       Has not been taking 225 mg of levothyroxine as she had palpitations. She has been back on 200 mcg dose for several months.       She also did not start Ozempic as she was concerned about side effects and was also unsure how to administer.  She brought pen with her for demonstration.     The following portions of the patient's history were reviewed and updated as appropriate: allergies, current medications, past family history, past medical history, past social history, past surgical history, and problem list.    Review of Systems   Constitutional:  Negative for unexpected weight change.   Respiratory:  Negative for shortness of breath.    Cardiovascular:  Negative for chest pain and palpitations.   Psychiatric/Behavioral:  Negative for behavioral problems.        Objective   Physical Exam  Vitals and nursing note reviewed.   Constitutional:       Appearance: She is well-developed.   Neck:      Vascular: No carotid bruit.   Cardiovascular:      Rate and Rhythm: Normal rate and regular rhythm.   Pulmonary:      Effort: Pulmonary effort is normal.      Breath sounds: Normal breath sounds.   Neurological:       Mental Status: She is alert and oriented to person, place, and time.   Psychiatric:         Mood and Affect: Mood normal.         Behavior: Behavior normal.         Thought Content: Thought content normal.         Judgment: Judgment normal.         Assessment & Plan   Diagnoses and all orders for this visit:    1. Type 2 diabetes mellitus without complication, without long-term current use of insulin (Primary)  -     Comprehensive metabolic panel  -     Lipid panel  -     CBC and Differential  -     TSH  -     T4, free  -     T3, free  -     Hemoglobin A1c  -     Vitamin D 25 hydroxy  -     Semaglutide,0.25 or 0.5MG/DOS, (Ozempic, 0.25 or 0.5 MG/DOSE,) 2 MG/3ML solution pen-injector; Inject 0.5 mg under the skin into the appropriate area as directed 1 (One) Time Per Week.  Dispense: 3 mL; Refill: 0    2. Acquired hypothyroidism  -     Comprehensive metabolic panel  -     Lipid panel  -     CBC and Differential  -     TSH  -     T4, free  -     T3, free  -     Hemoglobin A1c  -     Vitamin D 25 hydroxy  -     levothyroxine (SYNTHROID, LEVOTHROID) 200 MCG tablet; Take 1 tablet by mouth Daily.  Dispense: 90 tablet; Refill: 1    3. Vitamin D deficiency  -     Comprehensive metabolic panel  -     Lipid panel  -     CBC and Differential  -     TSH  -     T4, free  -     T3, free  -     Hemoglobin A1c  -     Vitamin D 25 hydroxy    4. Body mass index (BMI) of 60.0 to 69.9 in adult  -     Semaglutide,0.25 or 0.5MG/DOS, (Ozempic, 0.25 or 0.5 MG/DOSE,) 2 MG/3ML solution pen-injector; Inject 0.5 mg under the skin into the appropriate area as directed 1 (One) Time Per Week.  Dispense: 3 mL; Refill: 0    Other orders  -     lisinopril (PRINIVIL,ZESTRIL) 20 MG tablet; Take 1 tablet by mouth Daily.  Dispense: 90 tablet; Refill: 1  -     meloxicam (Mobic) 7.5 MG tablet; Take 1 tablet by mouth Daily.  Dispense: 30 tablet; Refill: 5        Patient walked through administration of Ozempic in office and administered medication herself.       She will go ahead and schedule lab appt for now and then will need f/u A1c 3 months after starting Ozempic.

## 2024-11-13 ENCOUNTER — APPOINTMENT (OUTPATIENT)
Dept: WOMENS IMAGING | Facility: HOSPITAL | Age: 59
End: 2024-11-13
Payer: COMMERCIAL

## 2024-11-13 PROCEDURE — 77067 SCR MAMMO BI INCL CAD: CPT | Performed by: RADIOLOGY

## 2024-11-13 PROCEDURE — 77063 BREAST TOMOSYNTHESIS BI: CPT | Performed by: RADIOLOGY

## 2024-11-30 DIAGNOSIS — E11.9 TYPE 2 DIABETES MELLITUS WITHOUT COMPLICATION, WITHOUT LONG-TERM CURRENT USE OF INSULIN: ICD-10-CM

## 2024-12-02 RX ORDER — SEMAGLUTIDE 0.68 MG/ML
0.5 INJECTION, SOLUTION SUBCUTANEOUS WEEKLY
Qty: 3 ML | Refills: 0 | Status: SHIPPED | OUTPATIENT
Start: 2024-12-02

## 2024-12-12 ENCOUNTER — TELEPHONE (OUTPATIENT)
Dept: FAMILY MEDICINE CLINIC | Facility: CLINIC | Age: 59
End: 2024-12-12

## 2024-12-12 DIAGNOSIS — I89.0 LYMPHEDEMA: Primary | ICD-10-CM

## 2024-12-12 NOTE — TELEPHONE ENCOUNTER
Caller: Sanaz Quiroz    Relationship: Self    Best call back number: [phone]     What is the medical concern/diagnosis: LYMPHEDEMA      What specialty or service is being requested: LYMPHEDEMA CLINIC    What is the provider, practice or medical service name: [facility]    What is the office location: [address] [place] [address]     What is the office phone number: [phone]    Any additional details: PATIENT IS NEEDING REFERRAL FOR THIS CLINIC TO HAVE TREATMENT FOR HER LEGS.

## 2025-01-21 ENCOUNTER — HOSPITAL ENCOUNTER (OUTPATIENT)
Dept: OCCUPATIONAL THERAPY | Facility: HOSPITAL | Age: 60
Setting detail: THERAPIES SERIES
Discharge: HOME | End: 2025-01-21
Payer: COMMERCIAL

## 2025-01-21 DIAGNOSIS — I89.0 LYMPHEDEMA OF BOTH LOWER EXTREMITIES: Primary | ICD-10-CM

## 2025-01-21 PROCEDURE — 97535 SELF CARE MNGMENT TRAINING: CPT

## 2025-01-21 PROCEDURE — 97166 OT EVAL MOD COMPLEX 45 MIN: CPT

## 2025-01-21 NOTE — THERAPY EVALUATION
Outpatient Occupational Therapy Lymphedema Initial Evaluation  [facility]     Patient Name: Sanaz Quiroz  : 1965  MRN: [id number]  Today's Date: 2025      Visit Date: 2025    Patient Active Problem List   Diagnosis    Hypothyroidism    Hypertension    Lymphedema    Anxiety    Sleep apnea    Type 2 diabetes mellitus        Past Medical History:   Diagnosis Date    Anxiety     Hypertension     Hypothyroidism     Lymphedema     Obesity     Sleep apnea     Type 2 diabetes mellitus 2022    a1c 6.5         Past Surgical History:   Procedure Laterality Date    CHOLECYSTECTOMY      FRACTURE SURGERY           Visit Dx:     ICD-10-CM ICD-9-CM   1. Lymphedema of both lower extremities  I89.0 457.1        Patient History       Row Name 25 1600             History    Chief Complaint Swelling;Tightness  -RE      Type of Pain Lower Extremity / Leg;Hip pain  -RE      Date Current Problem(s) Began 24  -RE      Brief Description of Current Complaint Jocelyne for evaluation of bilateral lower extremity lymphedema.  Patient has been treated for lymphedema in the past months and CircAid reduction kits at another facility.  She reports significant problems with the thigh portion slipping and moving around.  She found this very frustrating.  Currently her biggest concern is the increased edema in her thighs.  She feels that this gives her some problems with being able to sit at work.  She does use her compression pump and feels that it does improve her symptoms temporarily.  However, as soon as she returns to sitting at work her thigh edema increases substantially.  -RE      Patient/Caregiver Goals Decrease swelling;Know what to do to help the symptoms  -RE      How has patient tried to help current problem? Compression pump, has used Velcro compression products and compression stockings in the past  -RE      Are you or can you be pregnant No  -RE         Pain     Pain Location Leg  -RE      Pain  at Present 4  -RE      Pain Comments Left leg radicular pain  -RE         Fall Risk Assessment    Any falls in the past year: No  -RE         Services    Are you currently receiving Home Health services No  -RE         Daily Activities    Primary Language English  -RE      Are you able to read Yes  -RE      Are you able to write Yes  -RE      How does patient learn best? Listening  -RE      Teaching needs identified Home Exercise Program;Management of Condition  -RE      Patient is concerned about/has problems with Walking;Sitting;Other (comment)  -RE      Does patient have problems with the following? Anxiety;Panic Attack  -RE      Barriers to learning None  -RE      Functional Status mobility issues preventing performance of daily activities;bathing  Walks with a quad cane.  Patient reports that the large amount of edema in her thighs affects her balance.  Therefore she carries a cane.  -RE      Explanation of Functional Status Problem Unable to trim toenails.  Patient has difficulty reaching her feet.  -RE      Pt Participated in POC and Goals Yes  -RE         Safety    Are you being hurt, hit, or frightened by anyone at home or in your life? No  -RE      Are you being neglected by a caregiver No  -RE                User Key  (r) = Recorded By, (t) = Taken By, (c) = Cosigned By      Initials Name Provider Type    RE Daria Lagunas OTR Occupational Therapist                     Lymphedema       Row Name 01/21/25 1600             Subjective Pain    Able to rate subjective pain? yes  -RE      Pre-Treatment Pain Level 4  -RE      Post-Treatment Pain Level 4  -RE         Lymphedema Assessment    Lymphedema Classification RLE:;LLE:;secondary;stage 2 (Spontaneously Irreversible)  -RE      Infections or Cellulitis? no  -RE         LLIS - Physical Concerns    The amount of pain associated with my lymphedema is: 2  -RE      The amount of limb heaviness associated with my lymphedema is: 4  -RE      The amount of skin  "tightness associated with my lymphedema is: 3  -RE      The size of my swollen limb(s) seems: 3  -RE      Lymphedema affects the movement of my swollen limb(s): 2  -RE      The strength in my swollen limb(s) is: 3  -RE         LLIS - Psychosocial Concerns    Lymphedema affects my body image (i.e., \"how I think I look\"). 2  -RE      Lymphedema affects my socializing with others. 2  -RE      Lymphedema affects my intimate relations with spouse or partner (rate 0 if not applicable 0  -RE      Lymphedema \"gets me down\" (i.e., depression, frustration, or anger) 1  -RE      I must rely on others for help due to my lymphedema. 0  -RE      I know what to do to manage my lymphedema 0  -RE         LLIS - Functional Concerns    Lymphedema affects my ability to perform self-care activities (i.e. eating, dressing, hygiene) 1  -RE      Lymphedema affects my ability to perform routine home or work-related activities. 1  -RE      Lymphedema affects my performance of preferred leisure activities. 1  -RE      Lymphedema affects proper fit of clothing/shoes 1  -RE      Lymphedema affects my sleep 0  -RE         General ROM    GENERAL ROM COMMENTS Bilateral lower extremity active range of motion is within functional limits  -RE         MMT (Manual Muscle Testing)    General MMT Comments Bilateral lower extremity strength is grossly greater than or equal to 4/5.  Patient has generalized weakness.  She requires min assist for supine to sit.  -RE         Lymphedema Edema Assessment    Ptting Edema Category By grade out of 4  -RE      Pitting Edema + 3/4;Moderate;Severe  -RE      Stemmer Sign bilateral:;positive  -RE      Red Willow Hump bilateral:;negative  -RE         Skin Changes/Observations    Location/Assessment Lower Extremity  -RE      Lower Extremity Conditions bilateral:;clean;scaly;crust;dry;inflamed  -RE      Lower Extremity Color/Pigment bilateral:;red;hyperpigmented;brawny  -RE      Lower Extremity Skin Details Bilateral medial " thigh lobes  -RE         Lymphedema Sensation    Lymphedema Sensation Reports RLE:;LLE:;normal  -RE         Lymphedema Pulses/Capillary Refill    Lymphedema Pulses/Capillary Refill capillary refill  -RE      Capillary Refill lower extremity capillary refill  -RE      Lower Extremity Capillary Refill right:;left:;less than 3 seconds  -RE         Lymphedema Measurements    Measurement Type(s) Circumferential  -RE      Circumferential Areas Lower extremities  -RE         BLE Circumferential (cm)    Measurement Location 1 30 cm above knee  -RE      Left 1 78 cm  -RE      Right 1 85 cm  -RE      Measurement Location 2 20 cm above knee  -RE      Left 2 78 cm  -RE      Right 2 82 cm  -RE      Measurement Location 3 10 cm above knee  -RE      Left 3 84 cm  -RE      Right 3 86 cm  -RE      Measurement Location 4 Knee  -RE      Left 4 48 cm  -RE      Right 4 48 cm  -RE      Measurement Location 5 10 cm below knee  -RE      Left 5 47 cm  -RE      Right 5 46.5 cm  -RE      Measurement Location 6 20 cm below knee  -RE      Left 6 42.5 cm  -RE      Right 6 39.5 cm  -RE      Measurement Location 7 30 cm below knee  -RE      Left 7 31 cm  -RE      Right 7 32 cm  -RE      Measurement Location 8 Ankle  -RE      Left 8 30.5 cm  -RE      Right 8 27.7 cm  -RE      Measurement Location 9 Midfoot  -RE      Left 9 22.5 cm  -RE      Right 9 22.5 cm  -RE      LLE Circumferential Total 461.5 cm  -RE      RLE Circumferential Total 469.2 cm  -RE         Compression/Skin Care    Compression/Skin Care Comments Patient reports that she has CircAid reduction kits but they do not fit anymore.  -RE         Lymphedema Life Impact Scale Totals    A.  Total Q1 - Q17 (Do not include Q18) 26  -RE      B.  Total number of questions answered (Q1-Q17) 17  -RE      C. Divide A by B 1.53  -RE      D. Multiple C by 25 38.25  -RE                User Key  (r) = Recorded By, (t) = Taken By, (c) = Cosigned By      Initials Name Provider Type    Daria Heard  "OTR Occupational Therapist                            Therapy Education  Education Details: Discussed lymphedema treatment including estimated duration. Gave patient \"Healthy Habits for Lymphedema Patients\" and \"What is Lymphedema\" and reviewed with patient. Discussed disease process and what to expect from therapy.  We discussed at length some of the issues that patient have with the CircAid reduction kits in the past.  She may benefit from the use of a lobe kit to address the medial thigh lobes and to stabilize the entire reduction kit to possibly allow her to wear a full leg kit at least on 1 leg during the daytime.  Given: Edema management, Symptoms/condition management, Bandaging/dressing change  Program: New  How Provided: Verbal, Written  Provided to: Patient  Level of Understanding: Teach back education performed, Verbalized  30477 - OT Self Care/Mgmt Minutes: 15         OT Goals       Row Name 01/21/25 1600          OT Short Term Goals    STG Date to Achieve 02/21/25  -RE     STG 1 Patient will demonstrate proper awareness of “What is Lymphedema?” and \"Healthy Habits\" for improved prevention, management, care of symptoms and ease of transition to self-care of condition.  -RE     STG 1 Progress New  -RE     STG 2 Patient independent and compliant with self-wrapping techniques of compression bandages and/or velcro products with assistance of caregiver as needed for improved self-management of condition.  -RE     STG 2 Progress New  -RE     STG 3 Patient will demonstrate decreased net edema of bilateral lower extremities >/= 5-15 cm  for decrease in edema, symptoms, decreased risk of infection and improved skin care/transition to self-care of condition.  -RE     STG 3 Progress New  -RE        Long Term Goals    LTG Date to Achieve 04/21/25  -RE     LTG 1 Patient will demonstrate decreased net edema of bilateral lower extremities >/= 16-30 cm  for decrease in edema, symptoms, decreased risk of infection and " improved skin care/transition to self-care of condition.  -RE     LTG 1 Progress New  -RE     LTG 2 Patient independent and compliant with initial home exercise program focused on diaphragmatic breathing, range of motion, flexibility to decrease edema and improve lymphatic flow for decreased edema and decreased risk of infection.  -RE     LTG 2 Progress New  -RE     LTG 3 Patient to improve LLIS by 10 points by discharge.  -RE     LTG 3 Progress New  -RE     LTG 4 Patient independent and compliant with use and care of compression garments and/or Velcro products with assistance of a caregiver as needed to promote self-care independence.  -RE     LTG 4 Progress New  -RE        Time Calculation    OT Goal Re-Cert Due Date 04/21/25  -RE               User Key  (r) = Recorded By, (t) = Taken By, (c) = Cosigned By      Initials Name Provider Type    Daria Heard OTR Occupational Therapist                     OT Assessment/Plan       Row Name 01/21/25 1649          OT Assessment    Functional Limitations Limitation in home management  -RE     Impairments Edema;Impaired lymphatic circulation;Impaired venous circulation  -RE     Assessment Comments Patient is a 59-year-old female who presents with signs and symptoms consistent with bilateral lower extremity lymphedema which extends from toes to groin with bilateral medial thigh lobes.  Edema is firm with 3+ pitting.  The total circumference of the right lower extremity is 469.2 cm and the left lower extremity is 461.5 cm.  Comorbidities which may affect progress include limited mobility and obesity.  She could benefit from Complete Decongestive Therapy to decrease edema, protect and improve skin integrity, decrease the risk of infection, and to learn self-care strategies.  Patient will require the use of compression products both during treatment and after.  I have recommended the use of a CircAid reduction kit for the lower legs, thighs and 2 lobe kits and PAC bands  for both feet for use during the intensive phase of treatment.  -RE     OT Diagnosis Bilateral lower extremity lymphedema  -RE     OT Rehab Potential Good  -RE     Patient/caregiver participated in establishment of treatment plan and goals Yes  -RE     Patient would benefit from skilled therapy intervention Yes  -RE        OT Plan    OT Frequency 4x/week;3x/week  -RE     Predicted Duration of Therapy Intervention (OT) 6 weeks  -RE     Planned CPT's? OT EVAL MOD COMPLEXITY: 50812;OT SELF CARE/MGMT/TRAIN 15 MIN: 14713;OT MANUAL THERAPY EA 15 MIN: 32408  -RE     Planned Therapy Interventions (Optional Details) home exercise program;patient/family education;manual therapy techniques;other (see comments)  -RE     OT Plan Comments Will send patient's compression product order to [facility].  -RE               User Key  (r) = Recorded By, (t) = Taken By, (c) = Cosigned By      Initials Name Provider Type    RE Daria Lagunas OTR Occupational Therapist                              Time Calculation:   OT Start Time: 1330  OT Stop Time: 1425  OT Time Calculation (min): 55 min  Total Timed Code Minutes- OT: 15 minute(s)  Timed Charges  46975 - OT Self Care/Mgmt Minutes: 15  Untimed Charges  OT Eval/Re-eval Minutes: 40  Total Minutes  Timed Charges Total Minutes: 15  Untimed Charges Total Minutes: 40   Total Minutes: 55     Therapy Charges for Today       Code Description Service Date Service Provider Modifiers Qty    [id number]  OT SELF CARE/MGMT/TRAIN EA 15 MIN 1/21/2025 Daria Lagunas OTR GO 1    [id number] HC OT EVAL MOD COMPLEXITY 3 1/21/2025 Daria Lagunas OTR GO 1          Dictated utilizing Dragon dictation:  Much of this encounter note is an electronic transcription/translation of spoken language to printed text. The electronic translation of spoken language may permit erroneous, or at times, nonsensical words or phrases to be inadvertently transcribed; Although I have reviewed the note for such errors, some may still  exist.            Daria Lagunas, OTR  1/21/2025

## 2025-05-06 ENCOUNTER — OFFICE VISIT (OUTPATIENT)
Dept: FAMILY MEDICINE CLINIC | Facility: CLINIC | Age: 60
End: 2025-05-06
Payer: COMMERCIAL

## 2025-05-06 VITALS
WEIGHT: 293 LBS | OXYGEN SATURATION: 100 % | TEMPERATURE: 98.1 F | DIASTOLIC BLOOD PRESSURE: 88 MMHG | HEIGHT: 67 IN | HEART RATE: 78 BPM | BODY MASS INDEX: 45.99 KG/M2 | SYSTOLIC BLOOD PRESSURE: 144 MMHG

## 2025-05-06 DIAGNOSIS — E03.9 ACQUIRED HYPOTHYROIDISM: ICD-10-CM

## 2025-05-06 DIAGNOSIS — E55.9 VITAMIN D DEFICIENCY: ICD-10-CM

## 2025-05-06 DIAGNOSIS — B37.89 CANDIDA RASH OF GROIN: ICD-10-CM

## 2025-05-06 DIAGNOSIS — F41.9 ANXIETY: ICD-10-CM

## 2025-05-06 DIAGNOSIS — E11.9 TYPE 2 DIABETES MELLITUS WITHOUT COMPLICATION, WITHOUT LONG-TERM CURRENT USE OF INSULIN: ICD-10-CM

## 2025-05-06 RX ORDER — METRONIDAZOLE 500 MG/1
500 TABLET ORAL 2 TIMES DAILY
Qty: 14 TABLET | Refills: 0 | Status: SHIPPED | OUTPATIENT
Start: 2025-05-06

## 2025-05-06 RX ORDER — SEMAGLUTIDE 0.68 MG/ML
0.5 INJECTION, SOLUTION SUBCUTANEOUS WEEKLY
Qty: 3 ML | Refills: 0 | Status: SHIPPED | OUTPATIENT
Start: 2025-05-06

## 2025-05-06 RX ORDER — LISINOPRIL 20 MG/1
20 TABLET ORAL DAILY
Qty: 90 TABLET | Refills: 1 | Status: SHIPPED | OUTPATIENT
Start: 2025-05-06

## 2025-05-06 RX ORDER — LEVOTHYROXINE SODIUM 200 UG/1
200 TABLET ORAL DAILY
Qty: 90 TABLET | Refills: 1 | Status: SHIPPED | OUTPATIENT
Start: 2025-05-06

## 2025-05-06 RX ORDER — ALPRAZOLAM 0.5 MG
0.5 TABLET ORAL 3 TIMES DAILY PRN
Qty: 30 TABLET | Refills: 0 | Status: SHIPPED | OUTPATIENT
Start: 2025-05-06

## 2025-05-06 RX ORDER — NYSTATIN 100000 [USP'U]/G
POWDER TOPICAL 4 TIMES DAILY
Qty: 60 G | Refills: 11 | Status: SHIPPED | OUTPATIENT
Start: 2025-05-06

## 2025-05-06 NOTE — PROGRESS NOTES
"Subjective   Sanaz Quiroz is a 59 y.o. female.     History of Present Illness    Since the last visit, she has overall felt fairly well.  She has  hypertension and continues on lisinopril 20 mg daily.  She has hypothyroidism and type 2 diabetes and is due for labs to assess control.  She has been out of her Ozempic for several weeks.  She has noticed increased urination and is not sure if it is related to her lymphedema or diabetes.  She has tried to get in with the lymphedema clinic but has not been having any success .  she has been compliant with current medications have reviewed them.  The patient denies medication side effects.  Will refill medications. /88   Pulse 78   Temp 98.1 °F (36.7 °C) (Temporal)   Ht 170.2 cm (67\")   Wt (!) 176 kg (388 lb 3.2 oz)   LMP  (LMP Unknown)   SpO2 100%   BMI 60.80 kg/m² .          Results for orders placed or performed in visit on 03/28/24   ToxAssure Flex 14, Urine - Urine, Clean Catch    Collection Time: 04/05/24  9:15 AM    Specimen: Urine, Clean Catch   Result Value Ref Range    Report Summary FINAL     CREATININE 85 mg/dL    Amphetamines, IA Negative CUTOFF:300 ng/mL    Benzodiazepines Negative     Diazepam Urine, Qualitative Not Detected ng/mg creat    Desmethyldiazepam Not Detected ng/mg creat    Oxazepam, urine Not Detected ng/mg creat    Temazepam Not Detected ng/mg creat    Alprazolam Urine, Conf Not Detected ng/mg creat    Alpha-hydroxyalprazolam, Urine Not Detected ng/mg creat    Desalkylflurazepam, Urine Not Detected ng/mg creat    Lorazepam, Urine Not Detected ng/mg creat    Alpha-hydroxytriazolam, Urine Not Detected ng/mg creat    Clonazepam Not Detected ng/mg creat    7- AMINOCLONAZEPAM Not Detected ng/mg creat    Midazolam, Urine Not Detected ng/mg creat    Alpha-hydroxymidazolam, Urine Not Detected ng/mg creat    Flunitrazepam Not Detected ng/mg creat    DESMETHYLFLUNITRAZEPAM Not Detected ng/mg creat    COCAINE / METABOLITE, IA Negative " CUTOFF:150 ng/mL    Ethyl Alcohol, Enz Negative CUTOFF:0.020 g/dL    6-Acetylmorphine IA Negative CUTOFF:10 ng/mL    Opiate Class IA Negative CUTOFF:100 ng/mL    Oxycodone Class IA Negative CUTOFF:100 ng/mL    Methadone MTB IA Negative CUTOFF:100 ng/mL    METHADONE, IA Negative CUTOFF:100 ng/mL    Buprenorphine, Urine Negative     Buprenorphine, Urine Not Detected ng/mg creat    Norbuprenorphine Not Detected ng/mg creat    Fentanyl Negative     Fentanyl, Urine Not Detected ng/mg creat    Norfentanyl Urine Not Detected ng/mg creat    Tapentadol, IA Negative CUTOFF:200 ng/mL    TRAMADOL, IA Negative CUTOFF:200 ng/mL    Barbiturates, IA Negative CUTOFF:200 ng/mL    PHENCYCLIDINE, IA Negative CUTOFF:25 ng/mL     She gets 30 tablets of alprazolam to use as needed throughout the year for anxiety.  She is needing this refilled.     She also reports increased vaginal discharge with odor.  Mostly clear.  Denies rash, itching or burning. Had bacterial vaginosis in the past and feels that this is similar symptoms.     The following portions of the patient's history were reviewed and updated as appropriate: allergies, current medications, past family history, past medical history, past social history, past surgical history, and problem list.    Review of Systems   Constitutional:  Negative for unexpected weight change.   Respiratory:  Negative for shortness of breath.    Cardiovascular:  Negative for chest pain and palpitations.   Psychiatric/Behavioral:  Negative for behavioral problems.        Objective   Physical Exam  Vitals and nursing note reviewed.   Constitutional:       Appearance: Normal appearance. She is well-developed.   Neck:      Vascular: No carotid bruit.   Cardiovascular:      Rate and Rhythm: Normal rate and regular rhythm.   Pulmonary:      Effort: Pulmonary effort is normal.      Breath sounds: Normal breath sounds.   Neurological:      Mental Status: She is alert and oriented to person, place, and time.    Psychiatric:         Mood and Affect: Mood normal.         Behavior: Behavior normal.         Thought Content: Thought content normal.         Judgment: Judgment normal.         Assessment & Plan   Diagnoses and all orders for this visit:    1. Acquired hypothyroidism  -     Comprehensive metabolic panel  -     Lipid panel  -     CBC and Differential  -     TSH  -     T4, free  -     T3, free  -     Hemoglobin A1c  -     levothyroxine (SYNTHROID, LEVOTHROID) 200 MCG tablet; Take 1 tablet by mouth Daily.  Dispense: 90 tablet; Refill: 1    2. Type 2 diabetes mellitus without complication, without long-term current use of insulin  -     Comprehensive metabolic panel  -     Lipid panel  -     CBC and Differential  -     TSH  -     T4, free  -     T3, free  -     Hemoglobin A1c  -     Microalbumin / Creatinine Urine Ratio - Urine, Clean Catch  -     Semaglutide,0.25 or 0.5MG/DOS, (Ozempic, 0.25 or 0.5 MG/DOSE,) 2 MG/3ML solution pen-injector; Inject 0.5 mg under the skin into the appropriate area as directed 1 (One) Time Per Week.  Dispense: 3 mL; Refill: 0    3. Vitamin D deficiency  -     Comprehensive metabolic panel  -     Lipid panel  -     CBC and Differential  -     TSH  -     T4, free  -     T3, free  -     Hemoglobin A1c  -     Vitamin D 25 hydroxy    4. Anxiety  -     ALPRAZolam (XANAX) 0.5 MG tablet; Take 1 tablet by mouth 3 (Three) Times a Day As Needed for Anxiety.  Dispense: 30 tablet; Refill: 0    5. Body mass index (BMI) of 60.0 to 69.9 in adult  -     Semaglutide,0.25 or 0.5MG/DOS, (Ozempic, 0.25 or 0.5 MG/DOSE,) 2 MG/3ML solution pen-injector; Inject 0.5 mg under the skin into the appropriate area as directed 1 (One) Time Per Week.  Dispense: 3 mL; Refill: 0    6. Candida rash of groin  -     nystatin (MYCOSTATIN) 077744 UNIT/GM powder; Apply  topically to the appropriate area as directed 4 (Four) Times a Day.  Dispense: 60 g; Refill: 11    Other orders  -     lisinopril (PRINIVIL,ZESTRIL) 20 MG  tablet; Take 1 tablet by mouth Daily.  Dispense: 90 tablet; Refill: 1  -     metroNIDAZOLE (Flagyl) 500 MG tablet; Take 1 tablet by mouth 2 (Two) Times a Day.  Dispense: 14 tablet; Refill: 0          She will check with [facility] as they do have a therapist that are trained in lymphedema treatment.  She will let me know which location she wants referral to.  She will get labs today as she is fasting.  Will expect that her levels may be high due to being out of Ozempic which was restarted today.  We will need to increase the dose to 1 mg in 4 weeks.     ELIDIA reviewed.

## 2025-05-07 LAB
25(OH)D3+25(OH)D2 SERPL-MCNC: 23.2 NG/ML (ref 30–100)
ALBUMIN SERPL-MCNC: 4.4 G/DL (ref 3.8–4.9)
ALBUMIN/CREAT UR: 28 MG/G CREAT (ref 0–29)
ALP SERPL-CCNC: 102 IU/L (ref 44–121)
ALT SERPL-CCNC: 19 IU/L (ref 0–32)
AST SERPL-CCNC: 14 IU/L (ref 0–40)
BASOPHILS # BLD AUTO: 0.1 X10E3/UL (ref 0–0.2)
BASOPHILS NFR BLD AUTO: 1 %
BILIRUB SERPL-MCNC: 0.6 MG/DL (ref 0–1.2)
BUN SERPL-MCNC: 15 MG/DL (ref 6–24)
BUN/CREAT SERPL: 19 (ref 9–23)
CALCIUM SERPL-MCNC: 9.2 MG/DL (ref 8.7–10.2)
CHLORIDE SERPL-SCNC: 101 MMOL/L (ref 96–106)
CHOLEST SERPL-MCNC: 199 MG/DL (ref 100–199)
CO2 SERPL-SCNC: 25 MMOL/L (ref 20–29)
CREAT SERPL-MCNC: 0.79 MG/DL (ref 0.57–1)
CREAT UR-MCNC: 69 MG/DL
EGFRCR SERPLBLD CKD-EPI 2021: 87 ML/MIN/1.73
EOSINOPHIL # BLD AUTO: 0.2 X10E3/UL (ref 0–0.4)
EOSINOPHIL NFR BLD AUTO: 2 %
ERYTHROCYTE [DISTWIDTH] IN BLOOD BY AUTOMATED COUNT: 13.5 % (ref 11.7–15.4)
GLOBULIN SER CALC-MCNC: 3 G/DL (ref 1.5–4.5)
GLUCOSE SERPL-MCNC: 130 MG/DL (ref 70–99)
HBA1C MFR BLD: 6.8 % (ref 4.8–5.6)
HCT VFR BLD AUTO: 45.4 % (ref 34–46.6)
HDLC SERPL-MCNC: 46 MG/DL
HGB BLD-MCNC: 14.7 G/DL (ref 11.1–15.9)
IMM GRANULOCYTES # BLD AUTO: 0.1 X10E3/UL (ref 0–0.1)
IMM GRANULOCYTES NFR BLD AUTO: 1 %
LDLC SERPL CALC-MCNC: 139 MG/DL (ref 0–99)
LYMPHOCYTES # BLD AUTO: 1.8 X10E3/UL (ref 0.7–3.1)
LYMPHOCYTES NFR BLD AUTO: 23 %
MCH RBC QN AUTO: 29.4 PG (ref 26.6–33)
MCHC RBC AUTO-ENTMCNC: 32.4 G/DL (ref 31.5–35.7)
MCV RBC AUTO: 91 FL (ref 79–97)
MICROALBUMIN UR-MCNC: 19.5 UG/ML
MONOCYTES # BLD AUTO: 0.6 X10E3/UL (ref 0.1–0.9)
MONOCYTES NFR BLD AUTO: 8 %
NEUTROPHILS # BLD AUTO: 5.2 X10E3/UL (ref 1.4–7)
NEUTROPHILS NFR BLD AUTO: 65 %
PLATELET # BLD AUTO: 280 X10E3/UL (ref 150–450)
POTASSIUM SERPL-SCNC: 4.6 MMOL/L (ref 3.5–5.2)
PROT SERPL-MCNC: 7.4 G/DL (ref 6–8.5)
RBC # BLD AUTO: 5 X10E6/UL (ref 3.77–5.28)
SODIUM SERPL-SCNC: 140 MMOL/L (ref 134–144)
T3FREE SERPL-MCNC: 2.7 PG/ML (ref 2–4.4)
T4 FREE SERPL-MCNC: 1.34 NG/DL (ref 0.82–1.77)
TRIGL SERPL-MCNC: 77 MG/DL (ref 0–149)
TSH SERPL DL<=0.005 MIU/L-ACNC: 7.27 UIU/ML (ref 0.45–4.5)
VLDLC SERPL CALC-MCNC: 14 MG/DL (ref 5–40)
WBC # BLD AUTO: 7.8 X10E3/UL (ref 3.4–10.8)

## 2025-05-16 DIAGNOSIS — I89.0 LYMPHEDEMA: Primary | ICD-10-CM

## 2025-06-10 DIAGNOSIS — E11.9 TYPE 2 DIABETES MELLITUS WITHOUT COMPLICATION, WITHOUT LONG-TERM CURRENT USE OF INSULIN: ICD-10-CM

## 2025-06-10 RX ORDER — SEMAGLUTIDE 0.68 MG/ML
0.5 INJECTION, SOLUTION SUBCUTANEOUS WEEKLY
Qty: 3 ML | Refills: 0 | Status: SHIPPED | OUTPATIENT
Start: 2025-06-10

## 2025-06-10 NOTE — TELEPHONE ENCOUNTER
Rx Refill Note  Requested Prescriptions     Pending Prescriptions Disp Refills    Ozempic, 0.25 or 0.5 MG/DOSE, 2 MG/3ML solution pen-injector [Pharmacy Med Name: OZEMPIC 0.25-0.5 MG/DOSE(2 MG/3 ML)] 3 mL 0     Sig: DIAL AND INJECT UNDER THE SKIN 0.5 MG WEEKLY      Last office visit with prescribing clinician: 5/6/2025   Last telemedicine visit with prescribing clinician: Visit date not found   Next office visit with prescribing clinician: Visit date not found       RENATA Rai(R)  06/10/25, 10:11 EDT

## 2025-07-20 DIAGNOSIS — E11.9 TYPE 2 DIABETES MELLITUS WITHOUT COMPLICATION, WITHOUT LONG-TERM CURRENT USE OF INSULIN: ICD-10-CM

## 2025-07-21 RX ORDER — SEMAGLUTIDE 0.68 MG/ML
0.5 INJECTION, SOLUTION SUBCUTANEOUS WEEKLY
Qty: 3 ML | Refills: 0 | Status: SHIPPED | OUTPATIENT
Start: 2025-07-21

## 2025-07-22 DIAGNOSIS — E03.9 ACQUIRED HYPOTHYROIDISM: ICD-10-CM

## 2025-07-22 RX ORDER — LEVOTHYROXINE SODIUM 200 UG/1
200 TABLET ORAL DAILY
Qty: 30 TABLET | OUTPATIENT
Start: 2025-07-22